# Patient Record
Sex: FEMALE | Race: WHITE | NOT HISPANIC OR LATINO | Employment: OTHER | URBAN - METROPOLITAN AREA
[De-identification: names, ages, dates, MRNs, and addresses within clinical notes are randomized per-mention and may not be internally consistent; named-entity substitution may affect disease eponyms.]

---

## 2021-05-25 ENCOUNTER — RECORDS - HEALTHEAST (OUTPATIENT)
Dept: ADMINISTRATIVE | Facility: CLINIC | Age: 76
End: 2021-05-25

## 2021-05-27 ENCOUNTER — RECORDS - HEALTHEAST (OUTPATIENT)
Dept: ADMINISTRATIVE | Facility: CLINIC | Age: 76
End: 2021-05-27

## 2021-05-28 ENCOUNTER — RECORDS - HEALTHEAST (OUTPATIENT)
Dept: ADMINISTRATIVE | Facility: CLINIC | Age: 76
End: 2021-05-28

## 2021-05-30 ENCOUNTER — RECORDS - HEALTHEAST (OUTPATIENT)
Dept: ADMINISTRATIVE | Facility: CLINIC | Age: 76
End: 2021-05-30

## 2021-06-01 ENCOUNTER — RECORDS - HEALTHEAST (OUTPATIENT)
Dept: ADMINISTRATIVE | Facility: CLINIC | Age: 76
End: 2021-06-01

## 2021-06-02 ENCOUNTER — RECORDS - HEALTHEAST (OUTPATIENT)
Dept: ADMINISTRATIVE | Facility: CLINIC | Age: 76
End: 2021-06-02

## 2022-01-06 ENCOUNTER — APPOINTMENT (OUTPATIENT)
Dept: RADIOLOGY | Facility: HOSPITAL | Age: 77
End: 2022-01-06
Attending: EMERGENCY MEDICINE
Payer: COMMERCIAL

## 2022-01-06 ENCOUNTER — HOSPITAL ENCOUNTER (EMERGENCY)
Facility: HOSPITAL | Age: 77
Discharge: HOME | End: 2022-01-06
Attending: FAMILY MEDICINE | Admitting: FAMILY MEDICINE
Payer: COMMERCIAL

## 2022-01-06 VITALS
WEIGHT: 150 LBS | HEIGHT: 67 IN | DIASTOLIC BLOOD PRESSURE: 74 MMHG | HEART RATE: 67 BPM | SYSTOLIC BLOOD PRESSURE: 132 MMHG | BODY MASS INDEX: 23.54 KG/M2 | TEMPERATURE: 98.4 F | OXYGEN SATURATION: 93 % | RESPIRATION RATE: 20 BRPM

## 2022-01-06 DIAGNOSIS — U07.1 COVID-19: ICD-10-CM

## 2022-01-06 LAB
ALBUMIN SERPL-MCNC: 3.6 G/DL (ref 3.5–5)
ALP SERPL-CCNC: 130 U/L (ref 45–120)
ALT SERPL W P-5'-P-CCNC: 109 U/L (ref 0–45)
ANION GAP SERPL CALCULATED.3IONS-SCNC: 13 MMOL/L (ref 5–18)
AST SERPL W P-5'-P-CCNC: 132 U/L (ref 0–40)
BASOPHILS # BLD AUTO: 0 10E3/UL (ref 0–0.2)
BASOPHILS NFR BLD AUTO: 0 %
BILIRUB SERPL-MCNC: 1.1 MG/DL (ref 0–1)
BNP SERPL-MCNC: 20 PG/ML (ref 0–137)
BUN SERPL-MCNC: 16 MG/DL (ref 8–28)
CALCIUM SERPL-MCNC: 9.3 MG/DL (ref 8.5–10.5)
CHLORIDE BLD-SCNC: 99 MMOL/L (ref 98–107)
CO2 SERPL-SCNC: 21 MMOL/L (ref 22–31)
CREAT SERPL-MCNC: 0.84 MG/DL (ref 0.6–1.1)
EOSINOPHIL # BLD AUTO: 0 10E3/UL (ref 0–0.7)
EOSINOPHIL NFR BLD AUTO: 0 %
ERYTHROCYTE [DISTWIDTH] IN BLOOD BY AUTOMATED COUNT: 13.2 % (ref 10–15)
FLUAV RNA SPEC QL NAA+PROBE: NEGATIVE
FLUBV RNA RESP QL NAA+PROBE: NEGATIVE
GFR SERPL CREATININE-BSD FRML MDRD: 72 ML/MIN/1.73M2
GLUCOSE BLD-MCNC: 130 MG/DL (ref 70–125)
HCT VFR BLD AUTO: 42.3 % (ref 35–47)
HGB BLD-MCNC: 14.2 G/DL (ref 11.7–15.7)
IMM GRANULOCYTES # BLD: 0.1 10E3/UL
IMM GRANULOCYTES NFR BLD: 1 %
LYMPHOCYTES # BLD AUTO: 1.1 10E3/UL (ref 0.8–5.3)
LYMPHOCYTES NFR BLD AUTO: 17 %
MAGNESIUM SERPL-MCNC: 1.9 MG/DL (ref 1.8–2.6)
MCH RBC QN AUTO: 27.3 PG (ref 26.5–33)
MCHC RBC AUTO-ENTMCNC: 33.6 G/DL (ref 31.5–36.5)
MCV RBC AUTO: 81 FL (ref 78–100)
MONOCYTES # BLD AUTO: 0.4 10E3/UL (ref 0–1.3)
MONOCYTES NFR BLD AUTO: 6 %
NEUTROPHILS # BLD AUTO: 4.8 10E3/UL (ref 1.6–8.3)
NEUTROPHILS NFR BLD AUTO: 76 %
NRBC # BLD AUTO: 0 10E3/UL
NRBC BLD AUTO-RTO: 0 /100
PLAT MORPH BLD: NORMAL
PLATELET # BLD AUTO: 259 10E3/UL (ref 150–450)
POTASSIUM BLD-SCNC: 3.4 MMOL/L (ref 3.5–5)
PROT SERPL-MCNC: 7.6 G/DL (ref 6–8)
RBC # BLD AUTO: 5.2 10E6/UL (ref 3.8–5.2)
RBC MORPH BLD: NORMAL
SARS-COV-2 RNA RESP QL NAA+PROBE: POSITIVE
SODIUM SERPL-SCNC: 133 MMOL/L (ref 136–145)
TROPONIN I SERPL-MCNC: 0.02 NG/ML (ref 0–0.29)
WBC # BLD AUTO: 6.3 10E3/UL (ref 4–11)

## 2022-01-06 PROCEDURE — 87636 SARSCOV2 & INF A&B AMP PRB: CPT | Performed by: EMERGENCY MEDICINE

## 2022-01-06 PROCEDURE — 71046 X-RAY EXAM CHEST 2 VIEWS: CPT

## 2022-01-06 PROCEDURE — 84484 ASSAY OF TROPONIN QUANT: CPT | Performed by: EMERGENCY MEDICINE

## 2022-01-06 PROCEDURE — 258N000003 HC RX IP 258 OP 636: Performed by: FAMILY MEDICINE

## 2022-01-06 PROCEDURE — 83735 ASSAY OF MAGNESIUM: CPT | Performed by: EMERGENCY MEDICINE

## 2022-01-06 PROCEDURE — 250N000011 HC RX IP 250 OP 636: Performed by: FAMILY MEDICINE

## 2022-01-06 PROCEDURE — 83880 ASSAY OF NATRIURETIC PEPTIDE: CPT | Performed by: EMERGENCY MEDICINE

## 2022-01-06 PROCEDURE — 85025 COMPLETE CBC W/AUTO DIFF WBC: CPT | Performed by: EMERGENCY MEDICINE

## 2022-01-06 PROCEDURE — 96374 THER/PROPH/DIAG INJ IV PUSH: CPT

## 2022-01-06 PROCEDURE — 250N000013 HC RX MED GY IP 250 OP 250 PS 637: Performed by: FAMILY MEDICINE

## 2022-01-06 PROCEDURE — 258N000003 HC RX IP 258 OP 636: Performed by: EMERGENCY MEDICINE

## 2022-01-06 PROCEDURE — 82040 ASSAY OF SERUM ALBUMIN: CPT | Performed by: EMERGENCY MEDICINE

## 2022-01-06 PROCEDURE — 99285 EMERGENCY DEPT VISIT HI MDM: CPT | Mod: 25

## 2022-01-06 PROCEDURE — C9803 HOPD COVID-19 SPEC COLLECT: HCPCS

## 2022-01-06 PROCEDURE — 80053 COMPREHEN METABOLIC PANEL: CPT | Performed by: EMERGENCY MEDICINE

## 2022-01-06 PROCEDURE — 96361 HYDRATE IV INFUSION ADD-ON: CPT

## 2022-01-06 PROCEDURE — 250N000013 HC RX MED GY IP 250 OP 250 PS 637: Performed by: EMERGENCY MEDICINE

## 2022-01-06 PROCEDURE — 36415 COLL VENOUS BLD VENIPUNCTURE: CPT | Performed by: EMERGENCY MEDICINE

## 2022-01-06 PROCEDURE — 93005 ELECTROCARDIOGRAM TRACING: CPT | Performed by: EMERGENCY MEDICINE

## 2022-01-06 RX ORDER — POTASSIUM CHLORIDE 1500 MG/1
20 TABLET, EXTENDED RELEASE ORAL DAILY
Qty: 7 TABLET | Refills: 0 | Status: ON HOLD | OUTPATIENT
Start: 2022-01-06 | End: 2022-01-17

## 2022-01-06 RX ORDER — POTASSIUM CHLORIDE 1.5 G/1.58G
20 POWDER, FOR SOLUTION ORAL ONCE
Status: COMPLETED | OUTPATIENT
Start: 2022-01-06 | End: 2022-01-06

## 2022-01-06 RX ORDER — ACETAMINOPHEN 325 MG/1
975 TABLET ORAL ONCE
Status: COMPLETED | OUTPATIENT
Start: 2022-01-06 | End: 2022-01-06

## 2022-01-06 RX ORDER — ONDANSETRON 2 MG/ML
4 INJECTION INTRAMUSCULAR; INTRAVENOUS ONCE
Status: COMPLETED | OUTPATIENT
Start: 2022-01-06 | End: 2022-01-06

## 2022-01-06 RX ORDER — ONDANSETRON 4 MG/1
4 TABLET, ORALLY DISINTEGRATING ORAL EVERY 6 HOURS PRN
Qty: 20 TABLET | Refills: 0 | Status: SHIPPED | OUTPATIENT
Start: 2022-01-06 | End: 2022-01-09

## 2022-01-06 RX ADMIN — ACETAMINOPHEN 975 MG: 325 TABLET ORAL at 17:19

## 2022-01-06 RX ADMIN — POTASSIUM CHLORIDE FOR ORAL SOLUTION 20 MEQ: 1.5 POWDER, FOR SOLUTION ORAL at 20:18

## 2022-01-06 RX ADMIN — ONDANSETRON 4 MG: 2 INJECTION INTRAMUSCULAR; INTRAVENOUS at 20:16

## 2022-01-06 RX ADMIN — SODIUM CHLORIDE 1000 ML: 9 INJECTION, SOLUTION INTRAVENOUS at 20:15

## 2022-01-06 RX ADMIN — SODIUM CHLORIDE 500 ML: 9 INJECTION, SOLUTION INTRAVENOUS at 17:04

## 2022-01-06 ASSESSMENT — MIFFLIN-ST. JEOR: SCORE: 1208.03

## 2022-01-06 NOTE — ED NOTES
Patient is very anxious in triage.  Respiratory rate 26, feels short of breath, oxygen sats 93-94% on RA.  Charge nurse, stefanie Beasley.

## 2022-01-06 NOTE — ED PROVIDER NOTES
"ED Provider In Triage Note  [facility]  Encounter Date: Jan 6, 2022    Chief Complaint   Patient presents with     Fatigue       Brief HPI:   Paulina Mendez is a 76 year old female presenting to the Emergency Department with a chief complaint of generalized fatigue/weakness since around Dec 24, believes she has covid, although no test completed. She also states she had covid twice before, but no testing. Pt unvaccinated for covid. She states she feels so weak, she needed to come in now for evaluation.  Reports mild cough, dyspnea, but no chest pain or leg pain/swelling.      Brief Physical Exam:  /74   Pulse 72   Temp 99.9  F (37.7  C) (Oral)   Resp 16   Ht 1.702 m (5' 7\")   Wt 68 kg (150 lb)   SpO2 94%   BMI 23.49 kg/m    General: Non-toxic appearing  HEENT: Atraumatic  Resp: No respiratory distress  Abdomen: Non-peritoneal  Neuro: Alert, oriented, answers questions appropriately  Psych: Behavior appropriate    Plan Initiated in Triage:  No orders of the defined types were placed in this encounter.  Labs, CXR.      PIT Dispo:   Return to lobby while awaiting workup and ED bed availability    Chad Ferreira MD on 1/6/2022 at 2:52 PM    Patient was evaluated by the Physician in Triage due to a limitation of available rooms in the Emergency Department. A plan of care was discussed based on the information obtained on the initial evaluation and patient was consuled to return back to the Emergency Department lobby after this initial evalutaiton until results were obtained or a room became available in the Emergency Department. Patient was counseled not to leave prior to receiving the results of their workup.        Chad Ferreira MD  01/06/22 1458    "

## 2022-01-06 NOTE — ED TRIAGE NOTES
Patient presents to ED for generalized weakness that has been ongoing for the past 2 weeks.  Has been around others who are sick.

## 2022-01-07 LAB
ATRIAL RATE - MUSE: 76 BPM
DIASTOLIC BLOOD PRESSURE - MUSE: NORMAL MMHG
INTERPRETATION ECG - MUSE: NORMAL
P AXIS - MUSE: 53 DEGREES
PR INTERVAL - MUSE: 164 MS
QRS DURATION - MUSE: 80 MS
QT - MUSE: 396 MS
QTC - MUSE: 445 MS
R AXIS - MUSE: 67 DEGREES
SYSTOLIC BLOOD PRESSURE - MUSE: NORMAL MMHG
T AXIS - MUSE: 47 DEGREES
VENTRICULAR RATE- MUSE: 76 BPM

## 2022-01-07 NOTE — ED PROVIDER NOTES
"EMERGENCY DEPARTMENT ENCOUNTER      NAME: Paulina Mendez  AGE: 76 year old female  YOB: 1945  MRN: [id number]  EVALUATION DATE & TIME: No admission date for patient encounter.    PCP: No primary care provider on file.    ED PROVIDER: Truman Gu M.D.    Chief Complaint   Patient presents with     Fatigue       FINAL IMPRESSION:  1. COVID-19        ED COURSE & MEDICAL DECISION MAKING:    Pertinent Labs & Imaging studies personally reviewed and interpreted by me. (See chart for details)  7:53 PM Patient seen and examined, prior records reviewed.  Patient seen in the [facility] due to high inpatient and emergency department volumes.  differential diagnosis includes but not limited to pneumonia, sepsis, urinary tract infection, intra-abdominal infection, medication reaction, electrolyte disturbance, anemia, dysrhythmia, myocardial infarction.  Patient presents with generalized weakness, found to be Covid positive in the emergency department.  No hypoxia, increased work of breathing, tachycardia, chest pain.  Labs are reassuring other than slightly elevated LFTs, no abdominal pain or right upper quadrant tenderness on exam.  No vomiting, white blood cell count is normal.  Mild hyponatremia, patient is received IV fluids.  Mild hypokalemia which will be replaced orally.  Patient asked to be admitted \"to have a place to lay down, someone to watch me overnight, and IV fluids.\"  We discussed that there is no indication for admission at this time, and that there are no beds available in the emergency department.  She will be given additional IV fluids, oral potassium replacement, and discharged.  She can continue to monitor oxygen levels at home.    At the conclusion of the encounter I discussed the results of all of the tests and the disposition. The questions were answered. The patient or family acknowledged understanding and was agreeable with the care plan.     PROCEDURES: " "  Procedures    MEDICATIONS GIVEN IN THE EMERGENCY:  Medications   0.9% sodium chloride BOLUS (has no administration in time range)   ondansetron (ZOFRAN) injection 4 mg (has no administration in time range)   potassium chloride (KLOR-CON) Packet 20 mEq (has no administration in time range)   0.9% sodium chloride BOLUS (0 mLs Intravenous Stopped 1/6/22 1748)   acetaminophen (TYLENOL) tablet 975 mg (975 mg Oral Given 1/6/22 1719)       NEW PRESCRIPTIONS STARTED AT TODAY'S ER VISIT  New Prescriptions    ONDANSETRON (ZOFRAN ODT) 4 MG ODT TAB    Take 1 tablet (4 mg) by mouth every 6 hours as needed for nausea    POTASSIUM CHLORIDE ER (KLOR-CON M) 20 MEQ CR TABLET    Take 1 tablet (20 mEq) by mouth daily for 7 days       =================================================================    HPI    Patient information was obtained from: Patient      Paulina Mendez is a 76 year old female with a pertinent history of anxiety, hyperlipidemia, and hypothyroidism who comes in today with weakness.  Complains of fatigue, body aches for the last couple of weeks, gradually getting worse.  In triage, she is a positive Covid test and when I spoke with her, she said \"I already know that.\"  She says she had Covid 3 times.  She is concerned about her weakness.  She says she is drinking well although does have some nausea.  She has not been taking anything else for her symptoms recently.  No chest pain, mild shortness of breath.    REVIEW OF SYSTEMS   Review of Systems   All other systems reviewed and negative    PAST MEDICAL HISTORY:  No past medical history on file.    PAST SURGICAL HISTORY:  Past Surgical History:   Procedure Laterality Date     C UNLISTED PROCEDURE, ABDOMEN/PERITONEUM/OMENTUM      Description: Hernia Repair;  Recorded: 07/28/2008;     HC REMOVAL OF TONSILS,<13 Y/O      Description: Tonsillectomy;  Recorded: 07/28/2008;       CURRENT MEDICATIONS:    Current Facility-Administered Medications   Medication     " "0.9% sodium chloride BOLUS     ondansetron (ZOFRAN) injection 4 mg     potassium chloride (KLOR-CON) Packet 20 mEq     Current Outpatient Medications   Medication     ondansetron (ZOFRAN ODT) 4 MG ODT tab     potassium chloride ER (KLOR-CON M) 20 MEQ CR tablet       ALLERGIES:  No Known Allergies    FAMILY HISTORY:  No family history on file.    SOCIAL HISTORY:   Social History     Socioeconomic History     Marital status:      Spouse name: Not on file     Number of children: Not on file     Years of education: Not on file     Highest education level: Not on file   Occupational History     Not on file   Tobacco Use     Smoking status: Not on file     Smokeless tobacco: Not on file   Substance and Sexual Activity     Alcohol use: Not on file     Drug use: Not on file     Sexual activity: Not on file   Other Topics Concern     Not on file   Social History Narrative     Not on file     Social Determinants of Health     Financial Resource Strain: Not on file   Food Insecurity: Not on file   Transportation Needs: Not on file   Physical Activity: Not on file   Stress: Not on file   Social Connections: Not on file   Intimate Partner Violence: Not on file   Housing Stability: Not on file       VITALS:  /63   Pulse 63   Temp 100  F (37.8  C) (Oral)   Resp 20   Ht 1.702 m (5' 7\")   Wt 68 kg (150 lb)   SpO2 94%   BMI 23.49 kg/m      PHYSICAL EXAM:  Physical Exam  Vitals and nursing note reviewed.   Constitutional:       Appearance: Normal appearance.   HENT:      Head: Normocephalic and atraumatic.      Right Ear: External ear normal.      Left Ear: External ear normal.      Nose: Nose normal.      Mouth/Throat:      Mouth: Mucous membranes are moist.   Eyes:      Extraocular Movements: Extraocular movements intact.      Conjunctiva/sclera: Conjunctivae normal.      Pupils: Pupils are equal, round, and reactive to light.   Cardiovascular:      Rate and Rhythm: Normal rate and regular rhythm.   Pulmonary: "      Effort: Pulmonary effort is normal.      Breath sounds: Normal breath sounds. No wheezing or rales.   Abdominal:      General: Abdomen is flat. There is no distension.      Palpations: Abdomen is soft.      Tenderness: There is no abdominal tenderness. There is no guarding.   Musculoskeletal:         General: Normal range of motion.      Cervical back: Normal range of motion and neck supple.      Right lower leg: No edema.      Left lower leg: No edema.   Lymphadenopathy:      Cervical: No cervical adenopathy.   Skin:     General: Skin is warm and dry.   Neurological:      General: No focal deficit present.      Mental Status: She is alert and oriented to person, place, and time. Mental status is at baseline.      Comments: No gross focal neurologic deficits   Psychiatric:         Mood and Affect: Mood normal.         Behavior: Behavior normal.         Thought Content: Thought content normal.          LAB:  All pertinent labs reviewed and interpreted.  Results for orders placed or performed during the hospital encounter of 01/06/22   XR Chest 2 Views    Impression    IMPRESSION: There are moderate, asymmetric bilateral airspace opacities with a lower lobe and peripheral predominance, left greater than right consistent with a Covid pneumonia. Heart and pulmonary vascularity are normal. Descending thoracic aorta is   ectatic.   Comprehensive metabolic panel   Result Value Ref Range    Sodium 133 (L) 136 - 145 mmol/L    Potassium 3.4 (L) 3.5 - 5.0 mmol/L    Chloride 99 98 - 107 mmol/L    Carbon Dioxide (CO2) 21 (L) 22 - 31 mmol/L    Anion Gap 13 5 - 18 mmol/L    Urea Nitrogen 16 8 - 28 mg/dL    Creatinine 0.84 0.60 - 1.10 mg/dL    Calcium 9.3 8.5 - 10.5 mg/dL    Glucose 130 (H) 70 - 125 mg/dL    Alkaline Phosphatase 130 (H) 45 - 120 U/L     (H) 0 - 40 U/L     (H) 0 - 45 U/L    Protein Total 7.6 6.0 - 8.0 g/dL    Albumin 3.6 3.5 - 5.0 g/dL    Bilirubin Total 1.1 (H) 0.0 - 1.0 mg/dL    GFR Estimate 72  >60 mL/min/1.73m2   Result Value Ref Range    Troponin I 0.02 0.00 - 0.29 ng/mL   Result Value Ref Range    Magnesium 1.9 1.8 - 2.6 mg/dL   B-Type Natriuretic Peptide ([facility] Only)   Result Value Ref Range    BNP 20 0 - 137 pg/mL   Symptomatic; Unknown Influenza A/B & SARS-CoV2 (COVID-19) Virus PCR Multiplex Nose    Specimen: Nose; Swab   Result Value Ref Range    Influenza A PCR Negative Negative    Influenza B PCR Negative Negative    SARS CoV2 PCR Positive (A) Negative   CBC with platelets and differential   Result Value Ref Range    WBC Count 6.3 4.0 - 11.0 10e3/uL    RBC Count 5.20 3.80 - 5.20 10e6/uL    Hemoglobin 14.2 11.7 - 15.7 g/dL    Hematocrit 42.3 35.0 - 47.0 %    MCV 81 78 - 100 fL    MCH 27.3 26.5 - 33.0 pg    MCHC 33.6 31.5 - 36.5 g/dL    RDW 13.2 10.0 - 15.0 %    Platelet Count 259 150 - 450 10e3/uL    % Neutrophils 76 %    % Lymphocytes 17 %    % Monocytes 6 %    % Eosinophils 0 %    % Basophils 0 %    % Immature Granulocytes 1 %    NRBCs per 100 WBC 0 <1 /100    Absolute Neutrophils 4.8 1.6 - 8.3 10e3/uL    Absolute Lymphocytes 1.1 0.8 - 5.3 10e3/uL    Absolute Monocytes 0.4 0.0 - 1.3 10e3/uL    Absolute Eosinophils 0.0 0.0 - 0.7 10e3/uL    Absolute Basophils 0.0 0.0 - 0.2 10e3/uL    Absolute Immature Granulocytes 0.1 <=0.4 10e3/uL    Absolute NRBCs 0.0 10e3/uL   RBC and Platelet Morphology   Result Value Ref Range    Platelet Assessment  Automated Count Confirmed. Platelet morphology is normal.     Automated Count Confirmed. Platelet morphology is normal.    RBC Morphology Confirmed RBC Indices        RADIOLOGY:  Reviewed all pertinent imaging. Please see official radiology report.  XR Chest 2 Views   Final Result   IMPRESSION: There are moderate, asymmetric bilateral airspace opacities with a lower lobe and peripheral predominance, left greater than right consistent with a Covid pneumonia. Heart and pulmonary vascularity are normal. Descending thoracic aorta is    ectatic.          EKG:     Performed at: 3:04 PM  Impression: Nonspecific ST changes, no acute ischemic changes  Rate: 76  Rhythm: Sinus  Axis: Normal  LA Interval: 164  QRS Interval: 80  QTc Interval: 45 differential diagnosis includes but not limited to pneumonia, sepsis, urinary tract infection, intra-abdominal infection, medication reaction, electrolyte disturbance, anemia, dysrhythmia, myocardial infarction.  ST Changes: No acute ischemic changes  Comparison: Prior December 2010, no acute changes    I have independently reviewed and interpreted the EKG(s) documented above.    I, Bib Flores, am serving as a scribe to document services personally performed by Dr. Gu based on my observation and the provider's statements to me. I, Truman Gu MD attest that Bib Flores is acting in a scribe capacity, has observed my performance of the services and has documented them in accordance with my direction.    Truman Gu M.D.  Emergency Medicine  [facility] EMERGENCY DEPARTMENT  [facility] [facility] [id number]  [phone]  Dept: [phone]       Truman Gu MD  01/06/22 2010

## 2022-01-07 NOTE — DISCHARGE INSTRUCTIONS
Your liver tests are slightly elevated, which is commonly seen with viral infections including COVID and will resolve when you are feeling better.

## 2022-01-11 ENCOUNTER — APPOINTMENT (OUTPATIENT)
Dept: CT IMAGING | Facility: HOSPITAL | Age: 77
DRG: 177 | End: 2022-01-11
Attending: EMERGENCY MEDICINE
Payer: COMMERCIAL

## 2022-01-11 ENCOUNTER — HOSPITAL ENCOUNTER (INPATIENT)
Facility: HOSPITAL | Age: 77
LOS: 6 days | Discharge: HOME HEALTH | DRG: 177 | End: 2022-01-17
Attending: EMERGENCY MEDICINE | Admitting: INTERNAL MEDICINE
Payer: COMMERCIAL

## 2022-01-11 DIAGNOSIS — R74.01 TRANSAMINITIS: ICD-10-CM

## 2022-01-11 DIAGNOSIS — U07.1 2019 NOVEL CORONAVIRUS DISEASE (COVID-19): ICD-10-CM

## 2022-01-11 DIAGNOSIS — N30.00 ACUTE CYSTITIS WITHOUT HEMATURIA: Primary | ICD-10-CM

## 2022-01-11 DIAGNOSIS — J96.01 ACUTE RESPIRATORY FAILURE WITH HYPOXIA (H): ICD-10-CM

## 2022-01-11 LAB
ALBUMIN SERPL-MCNC: 2.6 G/DL (ref 3.5–5)
ALBUMIN UR-MCNC: 200 MG/DL
ALP SERPL-CCNC: 264 U/L (ref 45–120)
ALT SERPL W P-5'-P-CCNC: 182 U/L (ref 0–45)
ANION GAP SERPL CALCULATED.3IONS-SCNC: 9 MMOL/L (ref 5–18)
APPEARANCE UR: ABNORMAL
AST SERPL W P-5'-P-CCNC: 107 U/L (ref 0–40)
ATRIAL RATE - MUSE: 90 BPM
BACTERIA #/AREA URNS HPF: ABNORMAL /HPF
BASE EXCESS BLDV CALC-SCNC: 3.6 MMOL/L
BASOPHILS # BLD AUTO: 0 10E3/UL (ref 0–0.2)
BASOPHILS NFR BLD AUTO: 0 %
BILIRUB DIRECT SERPL-MCNC: 0.5 MG/DL
BILIRUB SERPL-MCNC: 1.1 MG/DL (ref 0–1)
BILIRUB UR QL STRIP: NEGATIVE
BNP SERPL-MCNC: 61 PG/ML (ref 0–137)
BUN SERPL-MCNC: 12 MG/DL (ref 8–28)
C REACTIVE PROTEIN LHE: 13.8 MG/DL (ref 0–0.8)
CALCIUM SERPL-MCNC: 9.2 MG/DL (ref 8.5–10.5)
CHLORIDE BLD-SCNC: 101 MMOL/L (ref 98–107)
CO2 SERPL-SCNC: 25 MMOL/L (ref 22–31)
COLOR UR AUTO: YELLOW
CREAT SERPL-MCNC: 0.7 MG/DL (ref 0.6–1.1)
D DIMER PPP FEU-MCNC: 8.31 UG/ML FEU (ref 0–0.5)
DIASTOLIC BLOOD PRESSURE - MUSE: NORMAL MMHG
EOSINOPHIL # BLD AUTO: 0 10E3/UL (ref 0–0.7)
EOSINOPHIL NFR BLD AUTO: 0 %
ERYTHROCYTE [DISTWIDTH] IN BLOOD BY AUTOMATED COUNT: 13.8 % (ref 10–15)
GFR SERPL CREATININE-BSD FRML MDRD: 90 ML/MIN/1.73M2
GLUCOSE BLD-MCNC: 130 MG/DL (ref 70–125)
GLUCOSE UR STRIP-MCNC: NEGATIVE MG/DL
HCO3 BLDV-SCNC: 26 MMOL/L (ref 24–30)
HCT VFR BLD AUTO: 38.6 % (ref 35–47)
HGB BLD-MCNC: 12.9 G/DL (ref 11.7–15.7)
HGB UR QL STRIP: ABNORMAL
HOLD SPECIMEN: NORMAL
HOLD SPECIMEN: NORMAL
IMM GRANULOCYTES # BLD: 0.2 10E3/UL
IMM GRANULOCYTES NFR BLD: 2 %
INTERPRETATION ECG - MUSE: NORMAL
KETONES UR STRIP-MCNC: NEGATIVE MG/DL
LEUKOCYTE ESTERASE UR QL STRIP: ABNORMAL
LYMPHOCYTES # BLD AUTO: 0.9 10E3/UL (ref 0.8–5.3)
LYMPHOCYTES NFR BLD AUTO: 8 %
MCH RBC QN AUTO: 27.9 PG (ref 26.5–33)
MCHC RBC AUTO-ENTMCNC: 33.4 G/DL (ref 31.5–36.5)
MCV RBC AUTO: 83 FL (ref 78–100)
MONOCYTES # BLD AUTO: 0.3 10E3/UL (ref 0–1.3)
MONOCYTES NFR BLD AUTO: 2 %
NEUTROPHILS # BLD AUTO: 10.7 10E3/UL (ref 1.6–8.3)
NEUTROPHILS NFR BLD AUTO: 88 %
NITRATE UR QL: NEGATIVE
NRBC # BLD AUTO: 0 10E3/UL
NRBC BLD AUTO-RTO: 0 /100
OXYHGB MFR BLDV: 28.9 % (ref 70–75)
P AXIS - MUSE: -17 DEGREES
PCO2 BLDV: 40 MM HG (ref 35–50)
PH BLDV: 7.45 [PH] (ref 7.35–7.45)
PH UR STRIP: 6.5 [PH] (ref 5–7)
PLATELET # BLD AUTO: 455 10E3/UL (ref 150–450)
PO2 BLDV: 19 MM HG (ref 25–47)
POTASSIUM BLD-SCNC: 4 MMOL/L (ref 3.5–5)
PR INTERVAL - MUSE: 144 MS
PROCALCITONIN SERPL-MCNC: 0.34 NG/ML (ref 0–0.49)
PROT SERPL-MCNC: 7.1 G/DL (ref 6–8)
QRS DURATION - MUSE: 74 MS
QT - MUSE: 344 MS
QTC - MUSE: 420 MS
R AXIS - MUSE: 79 DEGREES
RBC # BLD AUTO: 4.63 10E6/UL (ref 3.8–5.2)
RBC URINE: 5 /HPF
SAO2 % BLDV: 29.4 % (ref 70–75)
SODIUM SERPL-SCNC: 135 MMOL/L (ref 136–145)
SP GR UR STRIP: >1.05 (ref 1–1.03)
SQUAMOUS EPITHELIAL: 1 /HPF
SYSTOLIC BLOOD PRESSURE - MUSE: NORMAL MMHG
T AXIS - MUSE: 36 DEGREES
TROPONIN I SERPL-MCNC: <0.01 NG/ML (ref 0–0.29)
TSH SERPL DL<=0.005 MIU/L-ACNC: 0.66 UIU/ML (ref 0.3–5)
UROBILINOGEN UR STRIP-MCNC: 3 MG/DL
VENTRICULAR RATE- MUSE: 90 BPM
WBC # BLD AUTO: 11.9 10E3/UL (ref 4–11)
WBC URINE: 70 /HPF

## 2022-01-11 PROCEDURE — 99223 1ST HOSP IP/OBS HIGH 75: CPT | Performed by: INTERNAL MEDICINE

## 2022-01-11 PROCEDURE — 120N000001 HC R&B MED SURG/OB

## 2022-01-11 PROCEDURE — 250N000011 HC RX IP 250 OP 636: Performed by: EMERGENCY MEDICINE

## 2022-01-11 PROCEDURE — 250N000013 HC RX MED GY IP 250 OP 250 PS 637: Performed by: INTERNAL MEDICINE

## 2022-01-11 PROCEDURE — 99285 EMERGENCY DEPT VISIT HI MDM: CPT | Mod: 25

## 2022-01-11 PROCEDURE — 87086 URINE CULTURE/COLONY COUNT: CPT | Performed by: EMERGENCY MEDICINE

## 2022-01-11 PROCEDURE — 84145 PROCALCITONIN (PCT): CPT | Performed by: EMERGENCY MEDICINE

## 2022-01-11 PROCEDURE — 250N000011 HC RX IP 250 OP 636: Performed by: INTERNAL MEDICINE

## 2022-01-11 PROCEDURE — 36415 COLL VENOUS BLD VENIPUNCTURE: CPT | Performed by: EMERGENCY MEDICINE

## 2022-01-11 PROCEDURE — 84484 ASSAY OF TROPONIN QUANT: CPT | Performed by: EMERGENCY MEDICINE

## 2022-01-11 PROCEDURE — 83036 HEMOGLOBIN GLYCOSYLATED A1C: CPT | Performed by: INTERNAL MEDICINE

## 2022-01-11 PROCEDURE — 82248 BILIRUBIN DIRECT: CPT | Performed by: EMERGENCY MEDICINE

## 2022-01-11 PROCEDURE — 86140 C-REACTIVE PROTEIN: CPT | Performed by: EMERGENCY MEDICINE

## 2022-01-11 PROCEDURE — 93005 ELECTROCARDIOGRAM TRACING: CPT | Performed by: EMERGENCY MEDICINE

## 2022-01-11 PROCEDURE — 96374 THER/PROPH/DIAG INJ IV PUSH: CPT | Mod: 59

## 2022-01-11 PROCEDURE — 81003 URINALYSIS AUTO W/O SCOPE: CPT | Performed by: EMERGENCY MEDICINE

## 2022-01-11 PROCEDURE — 85379 FIBRIN DEGRADATION QUANT: CPT | Performed by: INTERNAL MEDICINE

## 2022-01-11 PROCEDURE — 71275 CT ANGIOGRAPHY CHEST: CPT

## 2022-01-11 PROCEDURE — 83880 ASSAY OF NATRIURETIC PEPTIDE: CPT | Performed by: EMERGENCY MEDICINE

## 2022-01-11 PROCEDURE — 82805 BLOOD GASES W/O2 SATURATION: CPT | Performed by: EMERGENCY MEDICINE

## 2022-01-11 PROCEDURE — 80053 COMPREHEN METABOLIC PANEL: CPT | Performed by: EMERGENCY MEDICINE

## 2022-01-11 PROCEDURE — 85004 AUTOMATED DIFF WBC COUNT: CPT | Performed by: EMERGENCY MEDICINE

## 2022-01-11 PROCEDURE — 84443 ASSAY THYROID STIM HORMONE: CPT | Performed by: EMERGENCY MEDICINE

## 2022-01-11 RX ORDER — MULTIPLE VITAMINS W/ MINERALS TAB 9MG-400MCG
1 TAB ORAL DAILY
Status: DISCONTINUED | OUTPATIENT
Start: 2022-01-12 | End: 2022-01-17 | Stop reason: HOSPADM

## 2022-01-11 RX ORDER — VITAMIN B COMPLEX
25 TABLET ORAL DAILY
Status: DISCONTINUED | OUTPATIENT
Start: 2022-01-12 | End: 2022-01-17 | Stop reason: HOSPADM

## 2022-01-11 RX ORDER — ONDANSETRON 4 MG/1
4 TABLET, ORALLY DISINTEGRATING ORAL EVERY 8 HOURS PRN
Status: ON HOLD | COMMUNITY
End: 2022-01-17

## 2022-01-11 RX ORDER — CEPHALEXIN 500 MG/1
500 CAPSULE ORAL 2 TIMES DAILY
Status: ON HOLD | COMMUNITY
End: 2022-01-17

## 2022-01-11 RX ORDER — CEPHALEXIN 500 MG/1
500 CAPSULE ORAL 2 TIMES DAILY
Status: DISCONTINUED | OUTPATIENT
Start: 2022-01-11 | End: 2022-01-17 | Stop reason: HOSPADM

## 2022-01-11 RX ORDER — MULTIVIT WITH MINERALS/LUTEIN
500 TABLET ORAL DAILY
Status: DISCONTINUED | OUTPATIENT
Start: 2022-01-12 | End: 2022-01-17 | Stop reason: HOSPADM

## 2022-01-11 RX ORDER — DEXAMETHASONE 2 MG/1
6 TABLET ORAL DAILY
Status: DISCONTINUED | OUTPATIENT
Start: 2022-01-12 | End: 2022-01-17 | Stop reason: HOSPADM

## 2022-01-11 RX ORDER — ASPIRIN 81 MG/1
81 TABLET ORAL
Status: ON HOLD | COMMUNITY
End: 2022-01-17

## 2022-01-11 RX ORDER — ASCORBIC ACID 500 MG
500 TABLET ORAL DAILY
COMMUNITY
End: 2022-02-17

## 2022-01-11 RX ORDER — MULTIPLE VITAMINS W/ MINERALS TAB 9MG-400MCG
1 TAB ORAL DAILY
COMMUNITY

## 2022-01-11 RX ORDER — ACETAMINOPHEN 325 MG/1
325-650 TABLET ORAL EVERY 6 HOURS PRN
Status: DISCONTINUED | OUTPATIENT
Start: 2022-01-11 | End: 2022-01-17 | Stop reason: HOSPADM

## 2022-01-11 RX ORDER — ASPIRIN 81 MG/1
81 TABLET ORAL
Status: DISCONTINUED | OUTPATIENT
Start: 2022-01-12 | End: 2022-01-17 | Stop reason: HOSPADM

## 2022-01-11 RX ORDER — IOPAMIDOL 755 MG/ML
100 INJECTION, SOLUTION INTRAVASCULAR ONCE
Status: COMPLETED | OUTPATIENT
Start: 2022-01-11 | End: 2022-01-11

## 2022-01-11 RX ORDER — ACETAMINOPHEN 325 MG/1
325-650 TABLET ORAL EVERY 6 HOURS PRN
Status: ON HOLD | COMMUNITY
End: 2022-01-17

## 2022-01-11 RX ORDER — ZINC GLUCONATE 50 MG
50 TABLET ORAL DAILY
Status: DISCONTINUED | OUTPATIENT
Start: 2022-01-12 | End: 2022-01-17 | Stop reason: HOSPADM

## 2022-01-11 RX ORDER — ZINC GLUCONATE 50 MG
50 TABLET ORAL DAILY
COMMUNITY
End: 2022-02-17

## 2022-01-11 RX ORDER — ONDANSETRON 4 MG/1
4 TABLET, ORALLY DISINTEGRATING ORAL EVERY 8 HOURS PRN
Status: DISCONTINUED | OUTPATIENT
Start: 2022-01-11 | End: 2022-01-17 | Stop reason: HOSPADM

## 2022-01-11 RX ORDER — DEXAMETHASONE SODIUM PHOSPHATE 10 MG/ML
6 INJECTION, SOLUTION INTRAMUSCULAR; INTRAVENOUS ONCE
Status: COMPLETED | OUTPATIENT
Start: 2022-01-11 | End: 2022-01-11

## 2022-01-11 RX ADMIN — ENOXAPARIN SODIUM 30 MG: 30 INJECTION SUBCUTANEOUS at 18:56

## 2022-01-11 RX ADMIN — DEXAMETHASONE SODIUM PHOSPHATE 6 MG: 10 INJECTION, SOLUTION INTRAMUSCULAR; INTRAVENOUS at 12:17

## 2022-01-11 RX ADMIN — CEPHALEXIN 500 MG: 500 CAPSULE ORAL at 20:21

## 2022-01-11 RX ADMIN — IOPAMIDOL 100 ML: 755 INJECTION, SOLUTION INTRAVENOUS at 13:19

## 2022-01-11 ASSESSMENT — ENCOUNTER SYMPTOMS
FEVER: 0
NECK STIFFNESS: 0
ARTHRALGIAS: 0
DIFFICULTY URINATING: 0
ABDOMINAL PAIN: 0
HEADACHES: 0
CONFUSION: 0
EYE REDNESS: 0
NAUSEA: 0
SHORTNESS OF BREATH: 1
FATIGUE: 1
COLOR CHANGE: 0

## 2022-01-11 ASSESSMENT — ACTIVITIES OF DAILY LIVING (ADL)
ADLS_ACUITY_SCORE: 9

## 2022-01-11 ASSESSMENT — MIFFLIN-ST. JEOR: SCORE: 1192.15

## 2022-01-11 NOTE — ED PROVIDER NOTES
EMERGENCY DEPARTMENT ENCOUNTER      NAME: Paulina Mendez  AGE: 76 year old female  YOB: 1945  MRN: [id number]  EVALUATION DATE & TIME: 1/11/2022 11:58 AM    PCP: No Ref-Primary, Physician    ED PROVIDER: Jacob Burr M.D.      Chief Complaint   Patient presents with     Covid Concern     Fatigue         IMPRESSION  1. 2019 novel coronavirus disease (COVID-19)    2. Acute respiratory failure with hypoxia (H)    3. Transaminitis        PLAN  - admit to hospitalist for further care; med/surg admit    ED COURSE & MEDICAL DECISION MAKING    ED Course as of 01/11/22 1416   Tue Jan 11, 2022   1212 76yoF with history of hypothyroidism, HLD, no smoking or lung disease, prior COVID-19 disease x2, no prior COVID-19 vaccination who tested positive for COVID-19 (3rd time) on 1/6/22 (5 days ago); symptoms worsening since onset on 12/26/21. Lives alone at home. Notes worsening fatigue & shortness of breath so came to the ED; satting 80% on room air on presentation (normalized on 4L NC on exam). No distress on exam, clear lungs with normal work of breathing, no stridor, trace pitting edema to to shins bilaterally (chronic & baseline per patient) with no calf tenderness, benign abdomen, normal neuro exam. Suspect worsening COVID-19 driving presentation; will require admission given new hypoxia. IV Decadron thus ordered. Will obtain CT, EKG, blood with plan for admission. Patient denies any symptoms currently while on oxygen; no pain or nausea. Patient comfortable with this plan; no further questions at this time.   1343 CT chest with no PE; has classic COVID-19 pneumonia changes; otherwise no large effusion, edema, pneumothorax, other acute abnormality or explanatory pathology. Procal pending at this time; would use this to direct antibiotics for possible secondary bacterial pneumonia although otherwise more suggestive of pure viral pneumonia. EKG with no ischemic changes and troponin negative; doubt  primary or secondary ACS, myocarditis. Labs otherwise with WBC 11, CRP 13 (consistent with COVID-19), no LUIS ALBERTO, no glaring electrolyte abnormality. Mild transaminitis with , , alk phos 264, Tbili 1.1; no notable change from 5 days ago and suspect secondary to viral syndrome. VBG unremarkable with no retention. TSH within normal limits. Patient stable on 4L NC here now; stable for floor. No indication for NPPV at this time.       12:02 PM I met with the patient for the initial interview and physical examination. Discussed plan for treatment and workup in the ED.  1:47 PM I discussed the case with hospitalist, who agrees to admission.      This patient involved a high degree of complexity in medical decision making, as significant risks were present and assessed.    I wore the following PPE during this patient encounter:  N95 mask, face shield w/ eye protection, gloves, gown    MEDICATIONS GIVEN IN THE EMERGENCY DEPARTMENT  Medications   Medication instructions: Do NOT use nebulized medications (has no administration in time range)   dexamethasone (DECADRON) tablet 6 mg (has no administration in time range)   enoxaparin ANTICOAGULANT (LOVENOX) injection 40 mg (has no administration in time range)   dexamethasone PF (DECADRON) injection 6 mg (6 mg Intravenous Given 1/11/22 1217)   iopamidol (ISOVUE-370) solution 100 mL (100 mLs Intravenous Given 1/11/22 1319)               =================================================================      HPI  Patient information was obtained from: Patient and family    Use of : N/A       Paulina Mendez is a 76 year old female with a pertinent history of hypothyroidism, HLD, no smoking or lung disease, prior COVID-19 disease x2, no prior COVID-19 vaccination who tested positive for COVID-19 (3rd time) on 1/6/22 (5 days ago); symptoms worsening since onset on 12/26/21, who presents to this ED by private vehicle accompanied by family for evaluation of  fatigue and shortness of breath. Lives alone at home. Notes worsening fatigue & shortness of breath so came to the ED; satting 80% on room air on presentation (normalized on 4L NC on exam).     Per chart review, the patient was seen in this ED on 1/6/2022 for generalized weakness. Patient was found to be COVID-19 positive. Labs showed slightly elevated LFTs, mild hyponatremia, and mild hypokalemia, which was replaced orally. Chest X-Ray showed moderate bilateral airspace opacities consistent with COVID pneumonia. Patient was discharged with prescriptions for ondansetron and potassium chloride. Patient was then seen at [facility] earlier today (1/11/22) for concern for UTI. Patient was placed on pulse oximeter for visit and was satting between 88-91% at rest. Urinalysis showed some evidence of urinary tract infection. The patient was given prescription for Keflex. It was recommended for patient to go to the ER for worsening of shortness of breath and low oxygen saturation.    REVIEW OF SYSTEMS   Review of Systems   Constitutional: Positive for fatigue. Negative for fever.   HENT: Negative for congestion.    Eyes: Negative for redness.   Respiratory: Positive for shortness of breath.    Cardiovascular: Negative for chest pain.   Gastrointestinal: Negative for abdominal pain and nausea.   Genitourinary: Negative for difficulty urinating.   Musculoskeletal: Negative for arthralgias and neck stiffness.   Skin: Negative for color change.   Neurological: Negative for headaches.   Psychiatric/Behavioral: Negative for confusion.   All other systems reviewed and are negative.          --------------- MEDICAL HISTORY ---------------  PAST MEDICAL HISTORY:  History reviewed. No pertinent past medical history.  Patient Active Problem List   Diagnosis     Hyperlipidemia     Osteoporosis     Hypothyroidism     Anxiety     Transaminitis     Acute respiratory failure with hypoxia (H)     2019 novel  coronavirus disease (COVID-19)       PAST SURGICAL HISTORY:  Past Surgical History:   Procedure Laterality Date     C UNLISTED PROCEDURE, ABDOMEN/PERITONEUM/OMENTUM      Description: Hernia Repair;  Recorded: 07/28/2008;     HC REMOVAL OF TONSILS,<13 Y/O      Description: Tonsillectomy;  Recorded: 07/28/2008;     ORTHOPEDIC SURGERY         CURRENT MEDICATIONS:      Current Facility-Administered Medications:      [START ON 1/12/2022] dexamethasone (DECADRON) tablet 6 mg, 6 mg, Oral, Daily, Shlomo Alicea MD     enoxaparin ANTICOAGULANT (LOVENOX) injection 40 mg, 40 mg, Subcutaneous, BID, Shlomo Alicea MD     Medication instructions: Do NOT use nebulized medications, , Does not apply, Continuous PRN, Shlomo Alicea MD    Current Outpatient Medications:      acetaminophen (TYLENOL) 325 MG tablet, Take 325-650 mg by mouth every 6 hours as needed for mild pain or fever, Disp: , Rfl:      aspirin 81 MG EC tablet, Take 81 mg by mouth three times a week Mon, Wed, Fri, Disp: , Rfl:      cephALEXin (KEFLEX) 500 MG capsule, Take 500 mg by mouth 2 times daily Started 1/11/22 for 7 days, has not taken a dose yet, Disp: , Rfl:      cholecalciferol 25 MCG (1000 UT) TABS, Take 1 tablet by mouth daily, Disp: , Rfl:      multivitamin w/minerals (MULTI-VITAMIN) tablet, Take 1 tablet by mouth daily, Disp: , Rfl:      ondansetron (ZOFRAN-ODT) 4 MG ODT tab, Take 4 mg by mouth every 8 hours as needed for nausea, Disp: , Rfl:      potassium chloride ER (KLOR-CON M) 20 MEQ CR tablet, Take 1 tablet (20 mEq) by mouth daily for 7 days, Disp: 7 tablet, Rfl: 0     vitamin C (ASCORBIC ACID) 500 MG tablet, Take 500 mg by mouth daily, Disp: , Rfl:      zinc gluconate 50 MG tablet, Take 50 mg by mouth daily, Disp: , Rfl:     ALLERGIES:  No Known Allergies    FAMILY HISTORY:  History reviewed. No pertinent family history.    SOCIAL HISTORY:   Social History     Socioeconomic History     Marital status:      Spouse name: None      Number of children: None     Years of education: None     Highest education level: None   Occupational History     None   Tobacco Use     Smoking status: Never Smoker     Smokeless tobacco: Never Used   Substance and Sexual Activity     Alcohol use: Not Currently     Drug use: Not Currently     Sexual activity: None   Other Topics Concern     None   Social History Narrative     None     Social Determinants of Health     Financial Resource Strain: Not on file   Food Insecurity: Not on file   Transportation Needs: Not on file   Physical Activity: Not on file   Stress: Not on file   Social Connections: Not on file   Intimate Partner Violence: Not on file   Housing Stability: Not on file         --------------- PHYSICAL EXAM ---------------  Nursing notes and vitals reviewed by me.  VITALS:  Vitals:    01/11/22 1230 01/11/22 1245 01/11/22 1300 01/11/22 1330   BP: (!) 141/66 133/65 136/65 (!) 155/69   Pulse: 82 84 82    Resp: 24 30 27    Temp:  99.1  F (37.3  C)     TempSrc:  Oral     SpO2: 95% 94% 94%    Weight:       Height:           PHYSICAL EXAM:    General:  alert, interactive, no distress  Eyes:  conjunctivae clear, conjugate gaze   HENT:  atraumatic, nose with no rhinorrhea, oropharynx clear  Neck:  no meningismus  Cardiovascular:  HR 80s during exam, regular rhythm, no murmurs, brisk cap refill  Chest:  no chest wall tenderness  Pulmonary:  no stridor, normal phonation, normal work of breathing, clear lungs bilaterally, satting 94% on 4 liters nasal cannula  Abdomen:  soft, nondistended, nontender  :  no CVA tenderness  Back:  no midline spinal tenderness  Musculoskeletal: no calf tenderness. Gross ROM intact to joints of extremities with no obvious deformities. Trace edema to mid shins bilaterally (patient states that it's chronic and baseline)  Skin:  warm, dry, no rash  Neuro:  awake, alert, answers questions appropriately, follows commands, moves all limbs  Psych:  calm, normal affect      ---------------  RESULTS ---------------  EKG:    Reviewed and interpreted by me.  NSR at 90bpm, no ST or T wave changes, normal intervals  Unchanged from prior on 1/6/22  My read.    LAB:  Reviewed and interpreted by me.  Results for orders placed or performed during the hospital encounter of 01/11/22   CT Chest Pulmonary Embolism w Contrast    Impression    IMPRESSION:    1.  No acute pulmonary embolism or aortopathy.  2.  Commonly reported imaging features of (COVID-19) pneumonia are present.   3.  Minimal hiatus hernia.   Basic metabolic panel   Result Value Ref Range    Sodium 135 (L) 136 - 145 mmol/L    Potassium 4.0 3.5 - 5.0 mmol/L    Chloride 101 98 - 107 mmol/L    Carbon Dioxide (CO2) 25 22 - 31 mmol/L    Anion Gap 9 5 - 18 mmol/L    Urea Nitrogen 12 8 - 28 mg/dL    Creatinine 0.70 0.60 - 1.10 mg/dL    Calcium 9.2 8.5 - 10.5 mg/dL    Glucose 130 (H) 70 - 125 mg/dL    GFR Estimate 90 >60 mL/min/1.73m2   Result Value Ref Range    Troponin I <0.01 0.00 - 0.29 ng/mL   B-Type Natriuretic Peptide ([facility] Only)   Result Value Ref Range    BNP 61 0 - 137 pg/mL   CBC with platelets and differential   Result Value Ref Range    WBC Count 11.9 (H) 4.0 - 11.0 10e3/uL    RBC Count 4.63 3.80 - 5.20 10e6/uL    Hemoglobin 12.9 11.7 - 15.7 g/dL    Hematocrit 38.6 35.0 - 47.0 %    MCV 83 78 - 100 fL    MCH 27.9 26.5 - 33.0 pg    MCHC 33.4 31.5 - 36.5 g/dL    RDW 13.8 10.0 - 15.0 %    Platelet Count 455 (H) 150 - 450 10e3/uL    % Neutrophils 88 %    % Lymphocytes 8 %    % Monocytes 2 %    % Eosinophils 0 %    % Basophils 0 %    % Immature Granulocytes 2 %    NRBCs per 100 WBC 0 <1 /100    Absolute Neutrophils 10.7 (H) 1.6 - 8.3 10e3/uL    Absolute Lymphocytes 0.9 0.8 - 5.3 10e3/uL    Absolute Monocytes 0.3 0.0 - 1.3 10e3/uL    Absolute Eosinophils 0.0 0.0 - 0.7 10e3/uL    Absolute Basophils 0.0 0.0 - 0.2 10e3/uL    Absolute Immature Granulocytes 0.2 <=0.4 10e3/uL    Absolute NRBCs 0.0 10e3/uL   Hepatic function panel   Result Value Ref Range     Bilirubin Total 1.1 (H) 0.0 - 1.0 mg/dL    Bilirubin Direct 0.5 <=0.5 mg/dL    Protein Total 7.1 6.0 - 8.0 g/dL    Albumin 2.6 (L) 3.5 - 5.0 g/dL    Alkaline Phosphatase 264 (H) 45 - 120 U/L     (H) 0 - 40 U/L     (H) 0 - 45 U/L   Blood gas venous   Result Value Ref Range    pH Venous 7.45 7.35 - 7.45    pCO2 Venous 40 35 - 50 mm Hg    pO2 Venous 19 (L) 25 - 47 mm Hg    Bicarbonate Venous 26 24 - 30 mmol/L    Base Excess/Deficit (+/-) 3.6   mmol/L    Oxyhemoglobin Venous 28.9 (L) 70.0 - 75.0 %    O2 Sat, Venous 29.4 (L) 70.0 - 75.0 %   CRP inflammation   Result Value Ref Range    CRP 13.8 (H) 0.0-<0.8 mg/dL   Result Value Ref Range    Procalcitonin 0.34 0.00 - 0.49 ng/mL   Extra Blue Top Tube   Result Value Ref Range    Hold Specimen JIC    Extra Green Top (Lithium Heparin) ON ICE   Result Value Ref Range    Hold Specimen JIC    UA with Microscopic reflex to Culture    Specimen: Urine, Midstream   Result Value Ref Range    Color Urine Yellow Colorless, Straw, Light Yellow, Yellow    Appearance Urine Turbid (A) Clear    Glucose Urine Negative Negative mg/dL    Bilirubin Urine Negative Negative    Ketones Urine Negative Negative mg/dL    Specific Gravity Urine >1.050 (H) 1.001 - 1.030    Blood Urine 0.1 mg/dL (A) Negative    pH Urine 6.5 5.0 - 7.0    Protein Albumin Urine 200  (A) Negative mg/dL    Urobilinogen Urine 3.0 (A) <2.0 mg/dL    Nitrite Urine Negative Negative    Leukocyte Esterase Urine 250 Cherri/uL (A) Negative    Bacteria Urine Many (A) None Seen /HPF    RBC Urine 5 (H) <=2 /HPF    WBC Urine 70 (H) <=5 /HPF    Squamous Epithelials Urine 1 <=1 /HPF   TSH with free T4 reflex   Result Value Ref Range    TSH 0.66 0.30 - 5.00 uIU/mL   D dimer quantitative   Result Value Ref Range    D-Dimer Quantitative 8.31 (H) 0.00 - 0.50 ug/mL FEU   ECG 12-LEAD WITH MUSE ([facility])   Result Value Ref Range    Systolic Blood Pressure  mmHg    Diastolic Blood Pressure  mmHg    Ventricular Rate 90 BPM    Atrial Rate  90 BPM    AL Interval 144 ms    QRS Duration 74 ms     ms    QTc 420 ms    P Axis -17 degrees    R AXIS 79 degrees    T Axis 36 degrees    Interpretation ECG        Poor data quality, interpretation may be adversely affected  Sinus rhythm  Nonspecific ST abnormality  Abnormal ECG  When compared with ECG of 06-JAN-2022 15:04,  Nonspecific T wave abnormality no longer evident in Anterolateral leads  Confirmed by SEE ED PROVIDER NOTE FOR, ECG INTERPRETATION ([id number]),  JEANNE GRULLON ([id number]) on 1/11/2022 12:44:30 PM         RADIOLOGY:  Reviewed by me. Please see official radiology report.  Recent Results (from the past 24 hour(s))   CT Chest Pulmonary Embolism w Contrast    Narrative    EXAM: CT CHEST PULMONARY EMBOLISM W CONTRAST  LOCATION: [facility]  DATE/TIME: 1/11/2022 1:14 PM    INDICATION: COVID pneumonia, hypoxia  COMPARISON: None.  TECHNIQUE: CT chest pulmonary angiogram during arterial phase injection of IV contrast. Multiplanar reformats and MIP reconstructions were performed. Dose reduction techniques were used.   CONTRAST: Isovue 370 100ml    FINDINGS:  ANGIOGRAM CHEST: Pulmonary arteries are normal caliber and negative for pulmonary emboli. No thoracic aortic aneurysm or findings to suggest acute aortic syndrome. A few small foci of atheromatous calcified plaque are present in the descending thoracic   aorta.    LUNGS AND PLEURA: Extensive bilateral mixed alveolar and interstitial opacities are present. The alveolar component is a mixture of groundglass and consolidative opacity. Within the territories of groundglass there is intralobular septal thickening   (crazy paving). Trachea and central airways are patent. No pleural effusion.    MEDIASTINUM: cardiac chambers are normal in size. No pericardial effusion. Conventional arch anatomy. Tortuosity of the imaged proximal great vessels. Small hiatal hernia. Esophagus is decompressed. Upper limit of normal-sized hilar,  subcarinal, and   right lower paratracheal lymph nodes, likely reactive in the setting of airspace disease.    CORONARY ARTERY CALCIFICATION: None.    UPPER ABDOMEN: Normal.    MUSCULOSKELETAL: Multilevel mild to moderate degenerative disc space narrowing and small marginal osteophytes of thoracic vertebra. No aggressive or destructive bone lesions are present. Mature bony callus around old fracture deformity of the right   lateral sixth rib.      Impression    IMPRESSION:    1.  No acute pulmonary embolism or aortopathy.  2.  Commonly reported imaging features of (COVID-19) pneumonia are present.   3.  Minimal hiatus hernia.       PROCEDURES:   Procedures   Critical Care     Performed by:   Jacob Burr MD   Authorized by:   Jacob Burr MD  Total critical care time: 35 minutes (Critical care time was exclusive of separately billable procedures and treating other patients.)    Critical care was necessary to treat or prevent imminent or life-threatening deterioration of the following conditions: COVID disease with hypoxia    Critical care was time spent personally by me on the following activities:  - obtaining history from patient or surrogate  - examination of patient  - development of treatment plan with patient or surrogate  - ordering and performing treatments and interventions  - ordering and review of laboratory studies  - ordering and review of radiographic studies  - re-evaluation of patient's condition  - monitoring for potential decompensation  - discussions with consultants  ---------------------------------------------------------------------------------------------------------------------  ---------------------------------------------------------------------------------------------------------------------        I, Pa Naa Isidro, am serving as a scribe to document services personally performed by Dr. Jacob Burr based on my observation and the provider's statements to me. I, Jacob Burr,  MD attest that Jasson Isidro is acting in a scribe capacity, has observed my performance of the services and has documented them in accordance with my direction.      Jacob Burr MD  01/11/22  Emergency Medicine  [facility] EMERGENCY DEPARTMENT  [address] [place] [id number]  [phone]  Dept: [phone]     Jacob Burr MD  01/11/22 4823

## 2022-01-11 NOTE — ED NOTES
Pt up to commode and oxygen did not reach so pt went without oxygen for about 5 minutes and when hooked back up pts saturations was 80% on room air.  Oxygen reapplied and at 4L/NC and saturations returned to 98%.  Pt was very SOB with little movement

## 2022-01-11 NOTE — PHARMACY-MEDICATION REGIMEN REVIEW
Pharmacy Note - Admission Medication History    Pertinent Provider Information: Keflex started today but pt never took yet, started vitamins recently for COVID     ______________________________________________________________________    Prior To Admission (PTA) med list completed and updated in EMR.       PTA Med List   Medication Sig Note Last Dose     acetaminophen (TYLENOL) 325 MG tablet Take 325-650 mg by mouth every 6 hours as needed for mild pain or fever  Past Week at Unknown time     aspirin 81 MG EC tablet Take 81 mg by mouth three times a week Mon, Wed, Fri  1/10/2022 at Unknown time     cephALEXin (KEFLEX) 500 MG capsule Take 500 mg by mouth 2 times daily Started 1/11/22 for 7 days, has not taken a dose yet  did not start yet     cholecalciferol 25 MCG (1000 UT) TABS Take 1 tablet by mouth daily  1/11/2022 at Unknown time     multivitamin w/minerals (MULTI-VITAMIN) tablet Take 1 tablet by mouth daily  1/11/2022 at Unknown time     ondansetron (ZOFRAN-ODT) 4 MG ODT tab Take 4 mg by mouth every 8 hours as needed for nausea  Past Week at Unknown time     potassium chloride ER (KLOR-CON M) 20 MEQ CR tablet Take 1 tablet (20 mEq) by mouth daily for 7 days 1/11/2022: 1/7/22 for 7 days 1/11/2022 at Unknown time     vitamin C (ASCORBIC ACID) 500 MG tablet Take 500 mg by mouth daily  1/11/2022 at Unknown time     zinc gluconate 50 MG tablet Take 50 mg by mouth daily  1/11/2022 at Unknown time       Information source(s): [facility]Everywhere[facility]  Method of interview communication: phone    Summary of Changes to PTA Med List  New: entire list    Patient was asked about OTC/herbal products specifically.  PTA med list reflects this.    In the past week, patient estimated taking medication this percent of the time:  greater than 90%.    Allergies were reviewed, assessed, and updated with the patient.      Patient does not use any multi-dose medications prior to admission.    The information provided in  this note is only as accurate as the sources available at the time of the update(s).    Thank you for the opportunity to participate in the care of this patient.    Deana Dowd [facility]  1/11/2022 12:44 PM

## 2022-01-11 NOTE — ED NOTES
Pt consented to updating her sister-in-law about her Updated pt's sister-in-law , Nicki  ( #[phone]) about pt

## 2022-01-11 NOTE — ED TRIAGE NOTES
Pt recently diagnosed with Covid on 1/6 with having symptoms since 12/27. Pt was just seen in Urgent Care for a poss UTI. Pt complains of fatigue and weakness. Pt was 80% on room air. Placed on O2 at 5L before coming up to 94%. Pt visibly short of breath with conversation.

## 2022-01-11 NOTE — ED NOTES
Pt was asking for a  to help her with medical decisions, relayed this to incoming ALEIDA Gulilermo and if able to provide advance directive papers for pt to read and decide on

## 2022-01-11 NOTE — H&P
Admission History and Physical   Paulina Mendez,    1945,   [id number]    [facility]   Transaminitis [R74.01]    PCP: No Ref-Primary, Physician,    Code status:  No Order       Extended Emergency Contact Information  Primary Emergency Contact: TODD REED  Home Phone: [phone]  Relation: Brother  Secondary Emergency Contact: Adelia Nelson  Mobile Phone: [phone]  Relation: Daughter       Active Problems:    Transaminitis    Acute respiratory failure with hypoxia (H)    2019 novel coronavirus disease (COVID-19)      ASSESSMENT AND PLAN:  covid pna , not vaccinated, prior COVID disease x2, symptoms started on 21. She subsequently tested positive on 22.  CT showing extensive infiltrates.  Start steroids due to hypoxia, no remdesivir due to elevated LFTs.  Isolate per protocol, COVID labs daily    Acute respiratory failure due to above.  On O2 at 2 L/min.  Begin steroids as above.  Escalate therapy for COVID if worsening hypoxia    Elevated LFTs likely due to COVID, will trend LFTs.  Hold remdesivir for now    Hypothyroid, resume home meds same dose    Recent UTI diagnosis, continue antibiotics for 7 days    Full code per her wishes    DVT PPX:  Lovenox twice a day    Barriers to discharge hypoxia    Anticipated Discharge date inpatient more than 2 midnights          Chief Complaint:  Breathing difficulty few days duration    HPI:  Paulina Mendez is a 76 year old old female diagnosed COVID twice in the past not vaccinated presenting with symptoms of breathing difficulty a few days duration.  Also fatigue but no chest pain.  Denies abdominal pain, no diarrhea, no fever no headaches or urinary symptoms currently.  Work-up showed COVID-pneumonia with hypoxia.  She is admitted for the work-up      Medical History  Hypothyroid, hyperlipidemia, anxiety,      Surgical History  She  has a past surgical history that includes REMOVAL OF TONSILS,<13 Y/O;  "COMPARTMENT STUDY ABDOMINAL; and orthopedic surgery.      SOCIAL HISTORY:  Social History     Socioeconomic History     Marital status:      Spouse name: Not on file     Number of children: Not on file     Years of education: Not on file     Highest education level: Not on file   Occupational History     Not on file   Tobacco Use     Smoking status: Never Smoker     Smokeless tobacco: Never Used   Substance and Sexual Activity     Alcohol use: Not Currently     Drug use: Not Currently     Sexual activity: Not on file   Other Topics Concern     Not on file   Social History Narrative     Not on file     Social Determinants of Health     Financial Resource Strain: Not on file   Food Insecurity: Not on file   Transportation Needs: Not on file   Physical Activity: Not on file   Stress: Not on file   Social Connections: Not on file   Intimate Partner Violence: Not on file   Housing Stability: Not on file       FAMILY HISTORY:  History reviewed. No pertinent family history.      ALLERGIES:  No Known Allergies    MEDICATIONS:        ROS:  12 point review of systems reviewed and is negative except as stated above      PHYSICAL EXAM:  BP (!) 155/69   Pulse 82   Temp 99.1  F (37.3  C) (Oral)   Resp 27   Ht 1.676 m (5' 6\")   Wt 68 kg (150 lb)   SpO2 94%   BMI 24.21 kg/m      General: Alert and oriented x 3. Not in obvious distress.  HEENT: Pupils equal and reactive,ENT WNL   Neck- supple, No JVP elevation, lymphadenopathy or thyromegaly. Trachea-central.  Chest: Crackles bases  Heart: S1S2 regular. No M/R/G.  Abdomen: Soft. NT, ND. No organomegaly. Bowel sounds- active.  Back: No spine tenderness. No CVA tenderness.  Extremities: No leg swelling. Peripheral pulses 2+ bilaterally.  Neuro: No focal neurological deficit  Skin: no skin rashes     DIAGNOSTIC DATA:        EKG Results: Personally reviewed        Advanced Care Planning       Shlomo Alicea MD       "

## 2022-01-11 NOTE — ED PROVIDER NOTES
PIT Note     Patiently initially exposed to COVID-19 on [place]. Symptoms started on 12/27/21. She subsequently tested positive on 1/6/22. Patient has been short of breath, especially with activity. Over the last couple days, she has developed a cough with yellow-brown sputum. She denies any associated chest pain. She has felt incredibly fatigued and weak. She was seen at Urgent Care and diagnosed with a UTI and prescribed cephalexin which she has yet to start.     Patient is accompanied by her sister-in-law    PE:   General: alert and oriented  Pulm: CTA, no respiratory distress     Brianna Cano MD  01/11/22 2345

## 2022-01-12 PROBLEM — J12.82 PNEUMONIA DUE TO 2019 NOVEL CORONAVIRUS: Status: ACTIVE | Noted: 2022-01-11

## 2022-01-12 LAB
ALBUMIN SERPL-MCNC: 2.5 G/DL (ref 3.5–5)
ALP SERPL-CCNC: 230 U/L (ref 45–120)
ALT SERPL W P-5'-P-CCNC: 144 U/L (ref 0–45)
ANION GAP SERPL CALCULATED.3IONS-SCNC: 13 MMOL/L (ref 5–18)
AST SERPL W P-5'-P-CCNC: 67 U/L (ref 0–40)
BACTERIA UR CULT: ABNORMAL
BILIRUB DIRECT SERPL-MCNC: 0.4 MG/DL
BILIRUB SERPL-MCNC: 0.8 MG/DL (ref 0–1)
BUN SERPL-MCNC: 18 MG/DL (ref 8–28)
C REACTIVE PROTEIN LHE: 14.3 MG/DL (ref 0–0.8)
CALCIUM SERPL-MCNC: 9.6 MG/DL (ref 8.5–10.5)
CHLORIDE BLD-SCNC: 102 MMOL/L (ref 98–107)
CO2 SERPL-SCNC: 21 MMOL/L (ref 22–31)
CREAT SERPL-MCNC: 0.64 MG/DL (ref 0.6–1.1)
D DIMER PPP FEU-MCNC: 7.04 UG/ML FEU (ref 0–0.5)
ERYTHROCYTE [DISTWIDTH] IN BLOOD BY AUTOMATED COUNT: 13.7 % (ref 10–15)
FIBRINOGEN PPP-MCNC: 1089 MG/DL (ref 170–490)
GFR SERPL CREATININE-BSD FRML MDRD: >90 ML/MIN/1.73M2
GLUCOSE BLD-MCNC: 146 MG/DL (ref 70–125)
GLUCOSE BLDC GLUCOMTR-MCNC: 148 MG/DL (ref 70–99)
GLUCOSE BLDC GLUCOMTR-MCNC: 201 MG/DL (ref 70–99)
GLUCOSE BLDC GLUCOMTR-MCNC: 228 MG/DL (ref 70–99)
GLUCOSE BLDC GLUCOMTR-MCNC: 255 MG/DL (ref 70–99)
HBA1C MFR BLD: 6.2 %
HCT VFR BLD AUTO: 38.1 % (ref 35–47)
HGB BLD-MCNC: 12.5 G/DL (ref 11.7–15.7)
INR PPP: 1.16 (ref 0.85–1.15)
LDH SERPL L TO P-CCNC: 571 U/L (ref 125–220)
MCH RBC QN AUTO: 27.7 PG (ref 26.5–33)
MCHC RBC AUTO-ENTMCNC: 32.8 G/DL (ref 31.5–36.5)
MCV RBC AUTO: 84 FL (ref 78–100)
PLATELET # BLD AUTO: 526 10E3/UL (ref 150–450)
POTASSIUM BLD-SCNC: 4.3 MMOL/L (ref 3.5–5)
PROT SERPL-MCNC: 7.2 G/DL (ref 6–8)
RBC # BLD AUTO: 4.52 10E6/UL (ref 3.8–5.2)
SODIUM SERPL-SCNC: 136 MMOL/L (ref 136–145)
WBC # BLD AUTO: 10.1 10E3/UL (ref 4–11)

## 2022-01-12 PROCEDURE — 86140 C-REACTIVE PROTEIN: CPT | Performed by: INTERNAL MEDICINE

## 2022-01-12 PROCEDURE — 250N000011 HC RX IP 250 OP 636: Performed by: INTERNAL MEDICINE

## 2022-01-12 PROCEDURE — 85379 FIBRIN DEGRADATION QUANT: CPT | Performed by: INTERNAL MEDICINE

## 2022-01-12 PROCEDURE — 82248 BILIRUBIN DIRECT: CPT | Performed by: INTERNAL MEDICINE

## 2022-01-12 PROCEDURE — 250N000013 HC RX MED GY IP 250 OP 250 PS 637: Performed by: INTERNAL MEDICINE

## 2022-01-12 PROCEDURE — 99233 SBSQ HOSP IP/OBS HIGH 50: CPT | Performed by: INTERNAL MEDICINE

## 2022-01-12 PROCEDURE — 250N000012 HC RX MED GY IP 250 OP 636 PS 637: Performed by: INTERNAL MEDICINE

## 2022-01-12 PROCEDURE — 85610 PROTHROMBIN TIME: CPT | Performed by: INTERNAL MEDICINE

## 2022-01-12 PROCEDURE — 85384 FIBRINOGEN ACTIVITY: CPT | Performed by: INTERNAL MEDICINE

## 2022-01-12 PROCEDURE — 80053 COMPREHEN METABOLIC PANEL: CPT | Performed by: INTERNAL MEDICINE

## 2022-01-12 PROCEDURE — 36415 COLL VENOUS BLD VENIPUNCTURE: CPT | Performed by: INTERNAL MEDICINE

## 2022-01-12 PROCEDURE — 83615 LACTATE (LD) (LDH) ENZYME: CPT | Performed by: INTERNAL MEDICINE

## 2022-01-12 PROCEDURE — 85027 COMPLETE CBC AUTOMATED: CPT | Performed by: INTERNAL MEDICINE

## 2022-01-12 PROCEDURE — 120N000001 HC R&B MED SURG/OB

## 2022-01-12 RX ORDER — NICOTINE POLACRILEX 4 MG
15-30 LOZENGE BUCCAL
Status: DISCONTINUED | OUTPATIENT
Start: 2022-01-12 | End: 2022-01-17 | Stop reason: HOSPADM

## 2022-01-12 RX ORDER — BENZONATATE 100 MG/1
100 CAPSULE ORAL 3 TIMES DAILY PRN
Status: DISCONTINUED | OUTPATIENT
Start: 2022-01-12 | End: 2022-01-17 | Stop reason: HOSPADM

## 2022-01-12 RX ORDER — DEXTROSE MONOHYDRATE 25 G/50ML
25-50 INJECTION, SOLUTION INTRAVENOUS
Status: DISCONTINUED | OUTPATIENT
Start: 2022-01-12 | End: 2022-01-17 | Stop reason: HOSPADM

## 2022-01-12 RX ADMIN — DEXAMETHASONE 6 MG: 4 TABLET ORAL at 07:50

## 2022-01-12 RX ADMIN — Medication 50 MG: at 07:50

## 2022-01-12 RX ADMIN — INSULIN ASPART 1 UNITS: 100 INJECTION, SOLUTION INTRAVENOUS; SUBCUTANEOUS at 11:18

## 2022-01-12 RX ADMIN — ENOXAPARIN SODIUM 30 MG: 30 INJECTION SUBCUTANEOUS at 20:58

## 2022-01-12 RX ADMIN — ENOXAPARIN SODIUM 30 MG: 30 INJECTION SUBCUTANEOUS at 07:51

## 2022-01-12 RX ADMIN — Medication 500 MG: at 07:50

## 2022-01-12 RX ADMIN — INSULIN ASPART 1 UNITS: 100 INJECTION, SOLUTION INTRAVENOUS; SUBCUTANEOUS at 08:49

## 2022-01-12 RX ADMIN — Medication 25 MCG: at 07:50

## 2022-01-12 RX ADMIN — CEPHALEXIN 500 MG: 500 CAPSULE ORAL at 07:51

## 2022-01-12 RX ADMIN — CEPHALEXIN 500 MG: 500 CAPSULE ORAL at 20:58

## 2022-01-12 RX ADMIN — INSULIN ASPART 2 UNITS: 100 INJECTION, SOLUTION INTRAVENOUS; SUBCUTANEOUS at 17:29

## 2022-01-12 RX ADMIN — ASPIRIN 81 MG: 81 TABLET, COATED ORAL at 07:50

## 2022-01-12 RX ADMIN — MULTIPLE VITAMINS W/ MINERALS TAB 1 TABLET: TAB at 07:50

## 2022-01-12 ASSESSMENT — ACTIVITIES OF DAILY LIVING (ADL)
ADLS_ACUITY_SCORE: 9
ADLS_ACUITY_SCORE: 13
ADLS_ACUITY_SCORE: 9
DEPENDENT_IADLS:: INDEPENDENT
ADLS_ACUITY_SCORE: 13
ADLS_ACUITY_SCORE: 9

## 2022-01-12 ASSESSMENT — MIFFLIN-ST. JEOR: SCORE: 1229.79

## 2022-01-12 NOTE — ED NOTES
Oxygen saturations fell in mid 80's on 4L/NC.  Unable to get saturations up above 88%.  oximask applied at 10L and saturations up to 96%

## 2022-01-12 NOTE — PLAN OF CARE
Problem: Adult Inpatient Plan of Care  Goal: Plan of Care Review  Outcome: Improving  Flowsheets (Taken 1/12/2022 1318)  Plan of Care Reviewed With: patient  Goal: Patient-Specific Goal (Individualized)  Outcome: Improving  Goal: Absence of Hospital-Acquired Illness or Injury  Outcome: Improving  Intervention: Identify and Manage Fall Risk  Recent Flowsheet Documentation  Taken 1/12/2022 1205 by Laverne Scott RN  Safety Promotion/Fall Prevention: safety round/check completed  Taken 1/12/2022 0800 by Laverne Scott RN  Safety Promotion/Fall Prevention: safety round/check completed  Goal: Optimal Comfort and Wellbeing  Outcome: Improving   Pt is alert and oriented x3.pt is forgetful. Pt denies pain. Pt is med complaint. Pt's v/s stable. Pt is on 8 liter of O2 on oxymask sating 91-92%. Continue to monitor pt.

## 2022-01-12 NOTE — CONSULTS
Care Management Initial Consult    General Information  Assessment completed with: Children,  (daughter)  Type of CM/SW Visit: Initial Assessment    Primary Care Provider verified and updated as needed:     Readmission within the last 30 days: no previous admission in last 30 days      Reason for Consult: discharge planning  Advance Care Planning:            Communication Assessment  Patient's communication style: spoken language (English or Bilingual)    Hearing Difficulty or Deaf: no   Wear Glasses or Blind: yes    Cognitive  Cognitive/Neuro/Behavioral: WDL                      Living Environment:   People in home: alone     Current living Arrangements:        Able to return to prior arrangements: other (see comments)  Living Arrangement Comments:  (home care vs TCU)    Family/Social Support:  Care provided by: self  Provides care for: no one                Description of Support System:           Current Resources:   Patient receiving home care services: No     Community Resources: None  Equipment currently used at home: none  Supplies currently used at home: None    Employment/Financial:  Employment Status:          Financial Concerns:             Lifestyle & Psychosocial Needs:  Social Determinants of Health     Tobacco Use: Low Risk      Smoking Tobacco Use: Never Smoker     Smokeless Tobacco Use: Never Used   Alcohol Use: Not on file   Financial Resource Strain: Not on file   Food Insecurity: Not on file   Transportation Needs: Not on file   Physical Activity: Not on file   Stress: Not on file   Social Connections: Not on file   Intimate Partner Violence: Not on file   Depression: Not on file   Housing Stability: Not on file       Functional Status:  Prior to admission patient needed assistance:   Dependent ADLs:: Independent  Dependent IADLs:: Independent  Assesssment of Functional Status: Not at baseline with ADL Functioning    Mental Health Status:          Chemical Dependency Status:                 Values/Beliefs:  Spiritual, Cultural Beliefs, Confucianism Practices, Values that affect care:                 Additional Information:  AIDET completed. Writer spoke with patient's daughter Adelia: [phone] who lives in [place]. Patient + Covid 1/6/2022. She lives alone. Patient's other daughter lives out of state as well. Adelia would like updates and to know if possible 24 hours before her mom discharges so she or her sister can fly or drive here. Patient was independent, no DME or services. No oxygen. In hospital she had been on 10 L, now down to 4 L then 8 L per cannula.     Informed CM will follow. Family will transport at discharge. Depending on progression of care; TCU vs home care for patient. Oxygen assessment for home use? If 02 needs continue.     Teri Moe, ALEIDA, , ADNIEL    3939: Patient requesting help with her will and changing it. Very worked up and stressed. Gave patient Honoring choices and she refused to fill out. She wants a change to her will. She said it is old. Let patient know we don't help with walters, etc. Writer gave her paper and a pen so she could write her requests/changes on paper for the time being at least. Request [facility] to see patient.     Teri Moe RN

## 2022-01-12 NOTE — PROGRESS NOTES
[facility]    Medicine Progress Note - Hospitalist Service       Date of Admission:  1/11/2022  Principal Problem:    Pneumonia due to 2019 novel coronavirus  Active Problems:    Hypothyroidism    Transaminitis    Acute respiratory failure with hypoxia (H)     Assessment & Plan         76-year-old female with past medical history of hypothyroidism, hyperlipidemia, anxiety, and vaccinated against COVID-19 admitted with:      # Confirmed COVID-19 infection    # Acute Hypoxic Respiratory Failure secondary to COVID-19 infection  # Viral Pneumonia secondary to COVID-19 infection  # Transaminitis secondary to COVID 19      Symptom Onset 12/27/21   Date of 1st Positive Test 1/6/22   Vaccination Status Declines Vaccine       - COVID-19 special precautions, continuous pulse-ox  - Oxygen: continue current support with Venturi face mask at 10 L/min; titrate to keep SpO2 between 90-96%  - Labs: daily COVID labs ordered (CBC, creatinine, retic count, LDH, INR/PT, D-dimer, fibrinogen, troponin, CRP)   - Imaging: no additional imaging needed at this time  - Breathing treatments: no inhalers needed; avoid nebulizers in favor of MDIs   - IV fluids: not indicated at this time  - Antibiotics: indicated for treatment of UTIU   - COVID-Focused Medications: Dexamethasone 6 mg x 10 days or until hospital discharge, started on 1/11/22 and remdesivir not started due to abnormal LFTs  - DVT Prophylaxis: at high risk of thrombotic complications due to COVID-19 (DDimer = 7.04 ug/mL FEU (Ref range: 0.00 - 0.50 ug/mL FEU) ).          - PROPHYLACTIC dosing: lovenox 40mg daily        - consider anticoag on discharge for 30 days & until return to normal mobility    #Steroid-induced hyperglycemia  -Insulin sliding scale with meals    #UTI  - continue empiric Keflex pending urine Cx results     #Hyperlipidemia  -We will hold statin due to abnormal LFTs    #Hypothyroidism  -Continue levothyroxine       Diet: Regular Diet Adult  "   DVT Prophylaxis: Enoxaparin (Lovenox) SQ  Pineda Catheter: Not present  Central Lines: None  Code Status: No CPR- Do NOT Intubate      Disposition Plan   Expected Discharge: 01/16/2022  Anticipated discharge location:  Awaiting care coordination huddle TBD  Delays: Hypoxia       The patient's care was discussed with the Bedside Nurse and Patient. for total time 35 minutes with greater than 50% of total time spent in counseling and coordination of care.    Lilia Cortez MD  Hospitalist Service  [facility]  Securely message with the Vocera Web Console (learn more here)  Text page via [employer] Paging/Directory        Clinically Significant Risk Factors Present on Admission             # Overweight: last Body mass index is 25.55 kg/m .      ______________________________________________________________________    Interval History   Remainder of 12 point review of systems negative except as noted below    Subjective:  Patient continues to complain of extreme fatigue.  Denies feeling short of breath current amount of supplemental O2.  Has been coughing, mostly unproductive cough.  Has some chest pain with coughing.  Denies having any headache, body aches, nausea, abdominal pain or diarrhea        Data reviewed today: I reviewed all medications, new labs and imaging results over the last 24 hours.     Physical Exam   Vital Signs: Temp: 97.7  F (36.5  C) Temp src: Oral BP: 118/65 Pulse: 82   Resp: 22 SpO2: 94 % O2 Device: Oxymask Oxygen Delivery: 10 LPM  Weight: 158 lbs 4.8 oz  Physical Exam:  Temp:  [97.6  F (36.4  C)-97.8  F (36.6  C)] 97.7  F (36.5  C)  Pulse:  [63-85] 82  Resp:  [18-30] 22  BP: (118-149)/(62-73) 118/65  SpO2:  [91 %-95 %] 94 %    /65 (BP Location: Right arm)   Pulse 82   Temp 97.7  F (36.5  C) (Oral)   Resp 22   Ht 1.676 m (5' 6\")   Wt 71.8 kg (158 lb 4.8 oz)   SpO2 94%   BMI 25.55 kg/m    General appearance: alert, appears stated age and cooperative  Head: " Normocephalic, without obvious abnormality  Eyes: Clear conjuctiva  Neck: no JVD and supple, symmetrical, trachea midline  Lungs: clear to auscultation bilaterally  Heart: regular rate and rhythm, S1, S2 normal, no murmur, click, rub or gallop  Abdomen: soft, non-tender; bowel sounds normal; no masses,  no organomegaly  Extremities: no edema, redness or tenderness in the calves or thighs  Skin: Skin color, texture, turgor normal. No rashes or lesions  Neurologic: Grossly normal          Data   Recent Labs   Lab 01/12/22  1701 01/12/22  1117 01/12/22  0914 01/12/22  0848 01/11/22  1228 01/11/22  1201 01/06/22  1704   WBC  --   --  10.1  --   --  11.9* 6.3   HGB  --   --  12.5  --   --  12.9 14.2   MCV  --   --  84  --   --  83 81   PLT  --   --  526*  --   --  455* 259   INR  --   --  1.16*  --   --   --   --    NA  --   --  136  --   --  135* 133*   POTASSIUM  --   --  4.3  --   --  4.0 3.4*   CHLORIDE  --   --  102  --   --  101 99   CO2  --   --  21*  --   --  25 21*   BUN  --   --  18  --   --  12 16   CR  --   --  0.64  --   --  0.70 0.84   ANIONGAP  --   --  13  --   --  9 13   ASHLI  --   --  9.6  --   --  9.2 9.3   * 201* 146*   < >  --  130* 130*   ALBUMIN  --   --  2.5*  --  2.6*  --  3.6   PROTTOTAL  --   --  7.2  --  7.1  --  7.6   BILITOTAL  --   --  0.8  --  1.1*  --  1.1*   ALKPHOS  --   --  230*  --  264*  --  130*   ALT  --   --  144*  --  182*  --  109*   AST  --   --  67*  --  107*  --  132*    < > = values in this interval not displayed.     No results found for this or any previous visit (from the past 24 hour(s)).  Medications     - MEDICATION INSTRUCTIONS -         aspirin  81 mg Oral Once per day on Mon Wed Fri     cephALEXin  500 mg Oral BID     dexamethasone  6 mg Oral Daily     enoxaparin ANTICOAGULANT  0.5 mg/kg Subcutaneous BID     insulin aspart  1-3 Units Subcutaneous TID AC     multivitamin w/minerals  1 tablet Oral Daily     vitamin C  500 mg Oral Daily     Vitamin D3  25 mcg  Oral Daily     zinc gluconate  50 mg Oral Daily

## 2022-01-12 NOTE — PLAN OF CARE
Pt is alert and oriented x3. Pt's v/s stable. Pt will transfer to unit p1. Report was called to the nurse on p1.

## 2022-01-12 NOTE — ED NOTES
Salome Alicea because last shift got pt up out of bed and sats dropped into the 80's so she placed her on oximask 10L (53 to 85%) from

## 2022-01-12 NOTE — ED NOTES
Text paged Dr. Alicea and let him know pt has been weaned to 4 L  oxymask (33-65%) and is saturating 92-93% while sleeping.

## 2022-01-12 NOTE — PHARMACY-ADMISSION MEDICATION HISTORY
Pharmacy Note - Admission Medication History     Pertinent Provider Information: Keflex started today but pt never took yet, started vitamins recently for COVID      ______________________________________________________________________     Prior To Admission (PTA) med list completed and updated in EMR.               PTA Med List   Medication Sig Note Last Dose     acetaminophen (TYLENOL) 325 MG tablet Take 325-650 mg by mouth every 6 hours as needed for mild pain or fever   Past Week at Unknown time     aspirin 81 MG EC tablet Take 81 mg by mouth three times a week Mon, Wed, Fri   1/10/2022 at Unknown time     cephALEXin (KEFLEX) 500 MG capsule Take 500 mg by mouth 2 times daily Started 1/11/22 for 7 days, has not taken a dose yet   did not start yet     cholecalciferol 25 MCG (1000 UT) TABS Take 1 tablet by mouth daily   1/11/2022 at Unknown time     multivitamin w/minerals (MULTI-VITAMIN) tablet Take 1 tablet by mouth daily   1/11/2022 at Unknown time     ondansetron (ZOFRAN-ODT) 4 MG ODT tab Take 4 mg by mouth every 8 hours as needed for nausea   Past Week at Unknown time     potassium chloride ER (KLOR-CON M) 20 MEQ CR tablet Take 1 tablet (20 mEq) by mouth daily for 7 days 1/11/2022: 1/7/22 for 7 days 1/11/2022 at Unknown time     vitamin C (ASCORBIC ACID) 500 MG tablet Take 500 mg by mouth daily   1/11/2022 at Unknown time     zinc gluconate 50 MG tablet Take 50 mg by mouth daily   1/11/2022 at Unknown time        Information source(s): Patient and CareEverywhere/SureScripts  Method of interview communication: phone     Summary of Changes to PTA Med List  New: entire list    Patient was asked about OTC/herbal products specifically.  PTA med list reflects this.     In the past week, patient estimated taking medication this percent of the time:  greater than 90%.     Allergies were reviewed, assessed, and updated with the patient.       Patient does not use any multi-dose medications prior to admission.     The  information provided in this note is only as accurate as the sources available at the time of the update(s).     Thank you for the opportunity to participate in the care of this patient.     Deana Dowd [facility]  1/11/2022 12:44 PM

## 2022-01-13 ENCOUNTER — APPOINTMENT (OUTPATIENT)
Dept: OCCUPATIONAL THERAPY | Facility: HOSPITAL | Age: 77
DRG: 177 | End: 2022-01-13
Attending: INTERNAL MEDICINE
Payer: COMMERCIAL

## 2022-01-13 ENCOUNTER — APPOINTMENT (OUTPATIENT)
Dept: PHYSICAL THERAPY | Facility: HOSPITAL | Age: 77
DRG: 177 | End: 2022-01-13
Attending: INTERNAL MEDICINE
Payer: COMMERCIAL

## 2022-01-13 LAB
ALBUMIN SERPL-MCNC: 2.3 G/DL (ref 3.5–5)
ALP SERPL-CCNC: 202 U/L (ref 45–120)
ALT SERPL W P-5'-P-CCNC: 194 U/L (ref 0–45)
ANION GAP SERPL CALCULATED.3IONS-SCNC: 11 MMOL/L (ref 5–18)
AST SERPL W P-5'-P-CCNC: 124 U/L (ref 0–40)
BILIRUB DIRECT SERPL-MCNC: 0.3 MG/DL
BILIRUB SERPL-MCNC: 0.6 MG/DL (ref 0–1)
BUN SERPL-MCNC: 22 MG/DL (ref 8–28)
C REACTIVE PROTEIN LHE: 6.5 MG/DL (ref 0–0.8)
CALCIUM SERPL-MCNC: 9.2 MG/DL (ref 8.5–10.5)
CHLORIDE BLD-SCNC: 105 MMOL/L (ref 98–107)
CO2 SERPL-SCNC: 22 MMOL/L (ref 22–31)
CREAT SERPL-MCNC: 0.67 MG/DL (ref 0.6–1.1)
D DIMER PPP FEU-MCNC: 3.93 UG/ML FEU (ref 0–0.5)
ERYTHROCYTE [DISTWIDTH] IN BLOOD BY AUTOMATED COUNT: 13.5 % (ref 10–15)
FIBRINOGEN PPP-MCNC: 804 MG/DL (ref 170–490)
GFR SERPL CREATININE-BSD FRML MDRD: >90 ML/MIN/1.73M2
GLUCOSE BLD-MCNC: 159 MG/DL (ref 70–125)
GLUCOSE BLDC GLUCOMTR-MCNC: 148 MG/DL (ref 70–99)
GLUCOSE BLDC GLUCOMTR-MCNC: 169 MG/DL (ref 70–99)
GLUCOSE BLDC GLUCOMTR-MCNC: 183 MG/DL (ref 70–99)
GLUCOSE BLDC GLUCOMTR-MCNC: 211 MG/DL (ref 70–99)
HCT VFR BLD AUTO: 37 % (ref 35–47)
HGB BLD-MCNC: 12 G/DL (ref 11.7–15.7)
INR PPP: 1.1 (ref 0.9–1.15)
LDH SERPL L TO P-CCNC: 572 U/L (ref 125–220)
MCH RBC QN AUTO: 27.3 PG (ref 26.5–33)
MCHC RBC AUTO-ENTMCNC: 32.4 G/DL (ref 31.5–36.5)
MCV RBC AUTO: 84 FL (ref 78–100)
PLATELET # BLD AUTO: 588 10E3/UL (ref 150–450)
POTASSIUM BLD-SCNC: 4.4 MMOL/L (ref 3.5–5)
PROT SERPL-MCNC: 6.6 G/DL (ref 6–8)
RBC # BLD AUTO: 4.39 10E6/UL (ref 3.8–5.2)
SODIUM SERPL-SCNC: 138 MMOL/L (ref 136–145)
WBC # BLD AUTO: 12.4 10E3/UL (ref 4–11)

## 2022-01-13 PROCEDURE — 97110 THERAPEUTIC EXERCISES: CPT | Mod: GO

## 2022-01-13 PROCEDURE — 36415 COLL VENOUS BLD VENIPUNCTURE: CPT | Performed by: INTERNAL MEDICINE

## 2022-01-13 PROCEDURE — 250N000011 HC RX IP 250 OP 636: Performed by: INTERNAL MEDICINE

## 2022-01-13 PROCEDURE — 999N000157 HC STATISTIC RCP TIME EA 10 MIN

## 2022-01-13 PROCEDURE — 97535 SELF CARE MNGMENT TRAINING: CPT | Mod: GO

## 2022-01-13 PROCEDURE — 82248 BILIRUBIN DIRECT: CPT | Performed by: INTERNAL MEDICINE

## 2022-01-13 PROCEDURE — 250N000012 HC RX MED GY IP 250 OP 636 PS 637: Performed by: INTERNAL MEDICINE

## 2022-01-13 PROCEDURE — 97162 PT EVAL MOD COMPLEX 30 MIN: CPT | Mod: GP

## 2022-01-13 PROCEDURE — 97166 OT EVAL MOD COMPLEX 45 MIN: CPT | Mod: GO

## 2022-01-13 PROCEDURE — 85384 FIBRINOGEN ACTIVITY: CPT | Performed by: INTERNAL MEDICINE

## 2022-01-13 PROCEDURE — 120N000001 HC R&B MED SURG/OB

## 2022-01-13 PROCEDURE — 99233 SBSQ HOSP IP/OBS HIGH 50: CPT | Performed by: INTERNAL MEDICINE

## 2022-01-13 PROCEDURE — 86140 C-REACTIVE PROTEIN: CPT | Performed by: INTERNAL MEDICINE

## 2022-01-13 PROCEDURE — 85014 HEMATOCRIT: CPT | Performed by: INTERNAL MEDICINE

## 2022-01-13 PROCEDURE — 85610 PROTHROMBIN TIME: CPT | Performed by: INTERNAL MEDICINE

## 2022-01-13 PROCEDURE — 83615 LACTATE (LD) (LDH) ENZYME: CPT | Performed by: INTERNAL MEDICINE

## 2022-01-13 PROCEDURE — 85379 FIBRIN DEGRADATION QUANT: CPT | Performed by: INTERNAL MEDICINE

## 2022-01-13 PROCEDURE — 80053 COMPREHEN METABOLIC PANEL: CPT | Performed by: INTERNAL MEDICINE

## 2022-01-13 PROCEDURE — 97116 GAIT TRAINING THERAPY: CPT | Mod: GP

## 2022-01-13 PROCEDURE — 250N000013 HC RX MED GY IP 250 OP 250 PS 637: Performed by: INTERNAL MEDICINE

## 2022-01-13 RX ADMIN — Medication 25 MCG: at 08:56

## 2022-01-13 RX ADMIN — DEXAMETHASONE 6 MG: 4 TABLET ORAL at 08:55

## 2022-01-13 RX ADMIN — Medication 50 MG: at 08:56

## 2022-01-13 RX ADMIN — CEPHALEXIN 500 MG: 500 CAPSULE ORAL at 08:56

## 2022-01-13 RX ADMIN — INSULIN ASPART 1 UNITS: 100 INJECTION, SOLUTION INTRAVENOUS; SUBCUTANEOUS at 17:10

## 2022-01-13 RX ADMIN — Medication 500 MG: at 08:55

## 2022-01-13 RX ADMIN — MULTIPLE VITAMINS W/ MINERALS TAB 1 TABLET: TAB at 08:56

## 2022-01-13 RX ADMIN — ENOXAPARIN SODIUM 30 MG: 30 INJECTION SUBCUTANEOUS at 08:56

## 2022-01-13 RX ADMIN — INSULIN ASPART 1 UNITS: 100 INJECTION, SOLUTION INTRAVENOUS; SUBCUTANEOUS at 12:44

## 2022-01-13 RX ADMIN — INSULIN ASPART 1 UNITS: 100 INJECTION, SOLUTION INTRAVENOUS; SUBCUTANEOUS at 08:57

## 2022-01-13 RX ADMIN — HUMAN INSULIN 15 UNITS: 100 INJECTION, SUSPENSION SUBCUTANEOUS at 08:58

## 2022-01-13 RX ADMIN — ENOXAPARIN SODIUM 30 MG: 30 INJECTION SUBCUTANEOUS at 19:34

## 2022-01-13 RX ADMIN — CEPHALEXIN 500 MG: 500 CAPSULE ORAL at 19:34

## 2022-01-13 ASSESSMENT — ACTIVITIES OF DAILY LIVING (ADL)
ADLS_ACUITY_SCORE: 9
ADLS_ACUITY_SCORE: 9
ADLS_ACUITY_SCORE: 4
ADLS_ACUITY_SCORE: 9
ADLS_ACUITY_SCORE: 4
ADLS_ACUITY_SCORE: 4
ADLS_ACUITY_SCORE: 9
ADLS_ACUITY_SCORE: 13
ADLS_ACUITY_SCORE: 4
ADLS_ACUITY_SCORE: 9
ADLS_ACUITY_SCORE: 6
ADLS_ACUITY_SCORE: 4
ADLS_ACUITY_SCORE: 9
ADLS_ACUITY_SCORE: 4
ADLS_ACUITY_SCORE: 4
ADLS_ACUITY_SCORE: 9

## 2022-01-13 NOTE — PLAN OF CARE
Problem: Gas Exchange Impaired  Goal: Optimal Gas Exchange  Outcome: Improving   Pt on 10L O2 via oxymask. Sats around 94%. Lung sounds with some crackles on the right side. Productive cough. SBA to the bathroom. Denied any pain.

## 2022-01-13 NOTE — PROGRESS NOTES
Care Management Follow Up    Length of Stay (days): 2    Expected Discharge Date: 01/16/2022 (Pending response to treatment and medical needs)       Concerns to be Addressed:   Alteration in respiratory status due to hypoxic respiratory failure, secondary to COVID-19, requiring supplemental oxygen at 10 liters.    Patient plan of care discussed at interdisciplinary rounds: Yes    Anticipated Discharge Disposition:  Discharge goal is home pending response to treatment, medical needs and mobility closer to discharge.      Anticipated Discharge Services:  To be determined.   Anticipated Discharge DME:  To be determined.     Patient/family educated on Medicare website which has current facility and service quality ratings:  NA  Education Provided on the Discharge Plan:   Per team.  Patient/Family in Agreement with the Plan:  yes    Referrals Placed by CM/SW:  None  Private pay costs discussed: Not applicable at this time.     Additional Information:  Patient lives alone and is independent with activities of daily living at baseline. Her daughter Adelia lives in [place] but is primary family contact. She (or Adelia's sister) might come to [place] to stay with patient at her home if given at least 24 hour notice prior to discharge. Of note, patient has no history of having been vaccinated against Covid-19 on file. CM will continue to monitor progression of care, review team recommendations and provide discharge planning assist as needed.      Monique Ramsey RN

## 2022-01-13 NOTE — PLAN OF CARE
Problem: Gas Exchange Impaired  Goal: Optimal Gas Exchange  Outcome: No Change     Problem: Adult Inpatient Plan of Care  Goal: Readiness for Transition of Care  1/12/2022 1859 by Twan Moy, RN  Outcome: No Change  1/12/2022 1855 by Twan Moy, RN  Outcome: Improving     Problem: Adult Inpatient Plan of Care  Goal: Optimal Comfort and Wellbeing  1/12/2022 1859 by Twan Moy, RN  Outcome: Improving  1/12/2022 1855 by Twan Moy RN  Outcome: Improving   Pt remains on 10 Lpm O2 via nasal canula. Pt was able to ambulate safely to bathroom with stand by assistance. Pt's lung sounds are diminished. No reports of pain noted. Pt remains on covid precautions.   Twan Moy RN  1/12/2022  7:01 PM

## 2022-01-13 NOTE — PROGRESS NOTES
Instructed PT on how to use the flutter valve. Pt was able to give return demonstration with good technique.  Shaka Lai, RT

## 2022-01-13 NOTE — PROGRESS NOTES
01/13/22 1518   Quick Adds   Type of Visit Initial PT Evaluation   Living Environment   People in home alone   Current Living Arrangements house   Home Accessibility   (1 step in; can live on one level )   Transportation Anticipated car, drives self   Self-Care   Equipment Currently Used at Home none  (has sec)   Activity/Exercise/Self-Care Comment reports totally independent   Disability/Function   Fall history within last six months no   General Information   Onset of Illness/Injury or Date of Surgery 12/24/21   Referring Physician catalino ramirez   Patient/Family Therapy Goals Statement (PT) get well/home   Existing Precautions/Restrictions   (10 L O2)   Cognition   Orientation Status (Cognition) oriented x 4   Pain Assessment   Patient Currently in Pain No   Strength   Strength Comments   (general LE MMT's fair)   Bed Mobility   Comment (Bed Mobility) indep., easy (HOB sl. elevated at home too)   Transfers   Transfer Safety Comments indep.    Gait/Stairs (Locomotion)   Distance in Feet (Required for LE Total Joints) 12, 35, 32  (and stood in place for >5:00)   Comment (Gait/Stairs) see treatment sheet   Balance   Balance   (SLS each leg 3-4 sec.)   Sensory Examination   Sensory Perception patient reports no sensory changes   Clinical Impression   Criteria for Skilled Therapeutic Intervention yes, treatment indicated   PT Diagnosis (PT) impaired functional mobility   Influenced by the following impairments SOB/low activity tolerance   Functional limitations due to impairments amb.    Clinical Presentation Stable/Uncomplicated   Clinical Presentation Rationale presents as medically diagnosed   Clinical Decision Making (Complexity) low complexity   Therapy Frequency (PT) Daily   Predicted Duration of Therapy Intervention (days/wks) 1 wk   Planned Therapy Interventions (PT) home exercise program;gait training;progressive activity/exercise   Risk & Benefits of therapy have been explained evaluation/treatment  results reviewed   PT Discharge Planning    PT Discharge Recommendation (DC Rec) home with assist;home with home care physical therapy;Transitional Care Facility   PT Rationale for DC Rec would need sig. assist d/t poor act. tolerance, but quite steady on her feet   Total Evaluation Time   Total Evaluation Time (Minutes) 20

## 2022-01-13 NOTE — PROGRESS NOTES
[facility]    Medicine Progress Note - Hospitalist Service       Date of Admission:  1/11/2022  Principal Problem:    Pneumonia due to 2019 novel coronavirus  Active Problems:    Hypothyroidism    Transaminitis    Acute respiratory failure with hypoxia (H)     Assessment & Plan         76-year-old female with past medical history of hypothyroidism, hyperlipidemia, anxiety,  unvaccinated against COVID-19 admitted with:      # Confirmed COVID-19 infection    # Acute Hypoxic Respiratory Failure secondary to COVID-19 infection  # Viral Pneumonia secondary to COVID-19 infection  # Transaminitis secondary to COVID 19      Symptom Onset 12/27/21   Date of 1st Positive Test 1/6/22   Vaccination Status Declines Vaccine       - COVID-19 special precautions, continuous pulse-ox  - Oxygen: continue current support with Venturi face mask at 10 L/min; titrate to keep SpO2 between 90-96%  - Labs: daily COVID labs ordered (CBC, creatinine, retic count, LDH, INR/PT, D-dimer, fibrinogen, troponin, CRP)  - Imaging: no additional imaging needed at this time  - Breathing treatments: no inhalers needed; avoid nebulizers in favor of MDIs   - Encourage incentive spirometer, flutter valve, self proning as tolerated  - IV fluids: not indicated at this time  - Antibiotics: indicated for treatment of UTI   - COVID-Focused Medications: Dexamethasone 6 mg x 10 days or until hospital discharge, started on 1/11/22 and remdesivir not started due to abnormal LFTs and patient/family would decline it anyway   - DVT Prophylaxis: at high risk of thrombotic complications due to COVID-19 (DDimer = 7.04 ug/mL FEU (Ref range: 0.00 - 0.50 ug/mL FEU) ).          - PROPHYLACTIC dosing: lovenox 40mg daily        - consider anticoag on discharge for 30 days & until return to normal mobility    #Steroid-induced hyperglycemia  -Insulin sliding scale with meals  -Start NPH 15 units in the morning at the time dexamethasone is  "given    #E.coli UTI  - will  Complete 5 days of Keflex    #Hyperlipidemia  -We will hold statin due to abnormal LFTs    #Hypothyroidism  -Continue levothyroxine       Diet: Regular Diet Adult    DVT Prophylaxis: Enoxaparin (Lovenox) SQ  Pineda Catheter: Not present  Central Lines: None  Code Status: No CPR- Do NOT Intubate      Disposition Plan   Expected Discharge: 01/16/2022  Anticipated discharge location:  Awaiting care coordination huddle TBD  Delays: Hypoxia       The patient's care was discussed with the Bedside Nurse and Patient. for total time 35 minutes with greater than 50% of total time spent in counseling and coordination of care.    Lilia Cortez MD  Hospitalist Service  [facility]  Securely message with the Vocera Web Console (learn more here)  Text page via [employer] [email or web address]        Clinically Significant Risk Factors Present on Admission             # Overweight: last Body mass index is 25.55 kg/m .      ______________________________________________________________________    Interval History   Remainder of 12 point review of systems negative except as noted below    Subjective:  Patient states that her breathing is better today.  She continues to cough.  Appetite is not great.        Data reviewed today: I reviewed all medications, new labs and imaging results over the last 24 hours.     Physical Exam   Vital Signs: Temp: 97.9  F (36.6  C) Temp src: Oral BP: (!) 143/56 Pulse: 65   Resp: 20 SpO2: 95 % O2 Device: High Flow Nasal Cannula (HFNC) Oxygen Delivery: 10 LPM  Weight: 158 lbs 4.8 oz  Physical Exam:  Temp:  [97.3  F (36.3  C)-97.9  F (36.6  C)] 97.9  F (36.6  C)  Pulse:  [65-82] 65  Resp:  [18-22] 20  BP: (118-143)/(56-70) 143/56  SpO2:  [90 %-96 %] 95 %    BP (!) 143/56 (BP Location: Right arm)   Pulse 65   Temp 97.9  F (36.6  C) (Oral)   Resp 20   Ht 1.676 m (5' 6\")   Wt 71.8 kg (158 lb 4.8 oz)   SpO2 95%   BMI 25.55 kg/m    General appearance: " alert, appears stated age and cooperative  Head: Normocephalic, without obvious abnormality  Eyes: Clear conjuctiva  Neck: no JVD and supple, symmetrical, trachea midline  Lungs: clear to auscultation bilaterally  Heart: regular rate and rhythm, S1, S2 normal, no murmur, click, rub or gallop  Abdomen: soft, non-tender; bowel sounds normal; no masses,  no organomegaly  Extremities: no edema, redness or tenderness in the calves or thighs  Skin: Skin color, texture, turgor normal. No rashes or lesions  Neurologic: Grossly normal          Data   Recent Labs   Lab 01/13/22  1239 01/13/22  0747 01/13/22  0703 01/12/22  1117 01/12/22  0914 01/11/22  1228 01/11/22  1201   WBC  --   --  12.4*  --  10.1  --  11.9*   HGB  --   --  12.0  --  12.5  --  12.9   MCV  --   --  84  --  84  --  83   PLT  --   --  588*  --  526*  --  455*   INR  --   --  1.10  --  1.16*  --   --    NA  --   --  138  --  136  --  135*   POTASSIUM  --   --  4.4  --  4.3  --  4.0   CHLORIDE  --   --  105  --  102  --  101   CO2  --   --  22  --  21*  --  25   BUN  --   --  22  --  18  --  12   CR  --   --  0.67  --  0.64  --  0.70   ANIONGAP  --   --  11  --  13  --  9   ASHLI  --   --  9.2  --  9.6  --  9.2   * 169* 159*   < > 146*   < > 130*   ALBUMIN  --   --  2.3*  --  2.5*   < >  --    PROTTOTAL  --   --  6.6  --  7.2   < >  --    BILITOTAL  --   --  0.6  --  0.8   < >  --    ALKPHOS  --   --  202*  --  230*   < >  --    ALT  --   --  194*  --  144*   < >  --    AST  --   --  124*  --  67*   < >  --     < > = values in this interval not displayed.     No results found for this or any previous visit (from the past 24 hour(s)).  Medications     - MEDICATION INSTRUCTIONS -         aspirin  81 mg Oral Once per day on Mon Wed Fri     cephALEXin  500 mg Oral BID     dexamethasone  6 mg Oral Daily     enoxaparin ANTICOAGULANT  0.5 mg/kg Subcutaneous BID     insulin aspart  1-3 Units Subcutaneous TID AC     insulin NPH  15 Units Subcutaneous QAM AC      multivitamin w/minerals  1 tablet Oral Daily     vitamin C  500 mg Oral Daily     Vitamin D3  25 mcg Oral Daily     zinc gluconate  50 mg Oral Daily

## 2022-01-13 NOTE — PLAN OF CARE
Problem: Adult Inpatient Plan of Care  Goal: Plan of Care Review  Outcome: Improving  Goal: Patient-Specific Goal (Individualized)  Outcome: Improving  Goal: Absence of Hospital-Acquired Illness or Injury  Outcome: Improving  Intervention: Identify and Manage Fall Risk  Recent Flowsheet Documentation  Taken 1/13/2022 0900 by Laverne Scott RN  Safety Promotion/Fall Prevention: safety round/check completed  Goal: Optimal Comfort and Wellbeing  Outcome: Improving  Goal: Readiness for Transition of Care  Outcome: Improving   Pt is alert and oriented x4. Pt is med complaint. Pt denies pain. Pt's v/s stable. Pt is on 10 liter of o2 on oxymask. No complain. Continue to monitor pt.

## 2022-01-14 LAB
ANION GAP SERPL CALCULATED.3IONS-SCNC: 9 MMOL/L (ref 5–18)
BUN SERPL-MCNC: 20 MG/DL (ref 8–28)
C REACTIVE PROTEIN LHE: 3.1 MG/DL (ref 0–0.8)
CALCIUM SERPL-MCNC: 9 MG/DL (ref 8.5–10.5)
CHLORIDE BLD-SCNC: 106 MMOL/L (ref 98–107)
CO2 SERPL-SCNC: 24 MMOL/L (ref 22–31)
CREAT SERPL-MCNC: 0.62 MG/DL (ref 0.6–1.1)
D DIMER PPP FEU-MCNC: 3.06 UG/ML FEU (ref 0–0.5)
ERYTHROCYTE [DISTWIDTH] IN BLOOD BY AUTOMATED COUNT: 13.6 % (ref 10–15)
FIBRINOGEN PPP-MCNC: 699 MG/DL (ref 170–490)
GFR SERPL CREATININE-BSD FRML MDRD: >90 ML/MIN/1.73M2
GLUCOSE BLD-MCNC: 102 MG/DL (ref 70–125)
GLUCOSE BLDC GLUCOMTR-MCNC: 106 MG/DL (ref 70–99)
GLUCOSE BLDC GLUCOMTR-MCNC: 117 MG/DL (ref 70–99)
GLUCOSE BLDC GLUCOMTR-MCNC: 167 MG/DL (ref 70–99)
GLUCOSE BLDC GLUCOMTR-MCNC: 274 MG/DL (ref 70–99)
HCT VFR BLD AUTO: 37.3 % (ref 35–47)
HGB BLD-MCNC: 12.3 G/DL (ref 11.7–15.7)
INR PPP: 1.02 (ref 0.9–1.15)
LDH SERPL L TO P-CCNC: 466 U/L (ref 125–220)
MCH RBC QN AUTO: 28 PG (ref 26.5–33)
MCHC RBC AUTO-ENTMCNC: 33 G/DL (ref 31.5–36.5)
MCV RBC AUTO: 85 FL (ref 78–100)
PLATELET # BLD AUTO: 615 10E3/UL (ref 150–450)
POTASSIUM BLD-SCNC: 4.6 MMOL/L (ref 3.5–5)
RBC # BLD AUTO: 4.39 10E6/UL (ref 3.8–5.2)
SODIUM SERPL-SCNC: 139 MMOL/L (ref 136–145)
WBC # BLD AUTO: 11.3 10E3/UL (ref 4–11)

## 2022-01-14 PROCEDURE — 36415 COLL VENOUS BLD VENIPUNCTURE: CPT | Performed by: INTERNAL MEDICINE

## 2022-01-14 PROCEDURE — 86140 C-REACTIVE PROTEIN: CPT | Performed by: INTERNAL MEDICINE

## 2022-01-14 PROCEDURE — 85018 HEMOGLOBIN: CPT | Performed by: INTERNAL MEDICINE

## 2022-01-14 PROCEDURE — 80048 BASIC METABOLIC PNL TOTAL CA: CPT | Performed by: INTERNAL MEDICINE

## 2022-01-14 PROCEDURE — 85379 FIBRIN DEGRADATION QUANT: CPT | Performed by: INTERNAL MEDICINE

## 2022-01-14 PROCEDURE — 85610 PROTHROMBIN TIME: CPT | Performed by: INTERNAL MEDICINE

## 2022-01-14 PROCEDURE — 99207 PR CDG-CORRECTLY CODED, REVIEWED AND AGREE: CPT | Performed by: HOSPITALIST

## 2022-01-14 PROCEDURE — 250N000011 HC RX IP 250 OP 636: Performed by: INTERNAL MEDICINE

## 2022-01-14 PROCEDURE — 99232 SBSQ HOSP IP/OBS MODERATE 35: CPT | Performed by: HOSPITALIST

## 2022-01-14 PROCEDURE — 250N000013 HC RX MED GY IP 250 OP 250 PS 637: Performed by: INTERNAL MEDICINE

## 2022-01-14 PROCEDURE — 83615 LACTATE (LD) (LDH) ENZYME: CPT | Performed by: INTERNAL MEDICINE

## 2022-01-14 PROCEDURE — 85384 FIBRINOGEN ACTIVITY: CPT | Performed by: INTERNAL MEDICINE

## 2022-01-14 PROCEDURE — 120N000001 HC R&B MED SURG/OB

## 2022-01-14 RX ADMIN — Medication 50 MG: at 09:50

## 2022-01-14 RX ADMIN — ENOXAPARIN SODIUM 30 MG: 30 INJECTION SUBCUTANEOUS at 09:49

## 2022-01-14 RX ADMIN — CEPHALEXIN 500 MG: 500 CAPSULE ORAL at 09:50

## 2022-01-14 RX ADMIN — Medication 500 MG: at 11:12

## 2022-01-14 RX ADMIN — HUMAN INSULIN 15 UNITS: 100 INJECTION, SUSPENSION SUBCUTANEOUS at 09:51

## 2022-01-14 RX ADMIN — CEPHALEXIN 500 MG: 500 CAPSULE ORAL at 21:10

## 2022-01-14 RX ADMIN — Medication 25 MCG: at 09:51

## 2022-01-14 RX ADMIN — ENOXAPARIN SODIUM 30 MG: 30 INJECTION SUBCUTANEOUS at 21:10

## 2022-01-14 RX ADMIN — INSULIN ASPART 2 UNITS: 100 INJECTION, SOLUTION INTRAVENOUS; SUBCUTANEOUS at 17:04

## 2022-01-14 RX ADMIN — ASPIRIN 81 MG: 81 TABLET, COATED ORAL at 09:50

## 2022-01-14 RX ADMIN — MULTIPLE VITAMINS W/ MINERALS TAB 1 TABLET: TAB at 09:49

## 2022-01-14 RX ADMIN — DEXAMETHASONE 6 MG: 4 TABLET ORAL at 09:50

## 2022-01-14 ASSESSMENT — ACTIVITIES OF DAILY LIVING (ADL)
ADLS_ACUITY_SCORE: 6
ADLS_ACUITY_SCORE: 4
ADLS_ACUITY_SCORE: 6
ADLS_ACUITY_SCORE: 4
ADLS_ACUITY_SCORE: 6
ADLS_ACUITY_SCORE: 6
ADLS_ACUITY_SCORE: 4
ADLS_ACUITY_SCORE: 6
ADLS_ACUITY_SCORE: 4
ADLS_ACUITY_SCORE: 6
ADLS_ACUITY_SCORE: 6
ADLS_ACUITY_SCORE: 4
ADLS_ACUITY_SCORE: 6
ADLS_ACUITY_SCORE: 4
ADLS_ACUITY_SCORE: 4

## 2022-01-14 NOTE — PROGRESS NOTES
Hospitalist Progress Note    Assessment/Plan    Principal Problem:    Pneumonia due to 2019 novel coronavirus  Active Problems:    Hypothyroidism    Transaminitis    Acute respiratory failure with hypoxia (H)    76-year-old patient with past medical history of hyperlipidemia, anxiety, unvaccinated against COVID-19, presented with shortness of breath found to have COVID-19 infection      COVID-19 infection,  Acute respiratory failure with hypoxia secondary to COVID-19,  Viral pneumonia secondary to COVID-19,    Symptom onset,      December 27,    Date of first test positive       1/6/2022  Vaccination status               declined vaccine    Patient is on COVID-19 precautions, oxygen requirement continue the same 10 L/min through Venturi mask,  Patient is onantibiotic for treatment of UTI,  Dexamethasone 6 mg for 10 days started on January 11,  Remdesivir was not started due to abnormal liver function test and because family declined remdesivir as well      Transaminitis  Likely secondary to viral illness, will monitor      DVT prophylaxis  Patient is on Lovenox 40 mg daily consider anticoagulation for 30 days or until return to normal mobility upon discharge      Steroid-induced hyperglycemia  On insulin sliding scale with meals,  NPH 15 units in the morning at time of dexamethasone        E. coli UTI   Keflex for treatment of UTI      Hyperlipidemia  Holding statin due to abnormal liver function test    Hypothyroidism  Resume levothyroxine        Barriers to Discharge: High oxygen needs    Anticipated discharge date/Disposition: Likely home when oxygen needs are stable in a few days    Subjective  Patient was seen this morning she was in good spirits, tolerating her breakfast, oxygen needs has been stable    Objective    Vital signs in last 24 hours  Temp:  [97.3  F (36.3  C)-98.1  F (36.7  C)] 97.6  F (36.4  C)  Pulse:  [66-77] 77  Resp:  [18] 18  BP: (128-134)/(63-72) 133/63  FiO2 (%):  [93 %] 93 %  SpO2:  [92  %-94 %] 92 % [unfilled] O2 Device: Oxymask    Weight:   [unfilled] Weight change:     Intake/Output last 3 shifts  I/O last 3 completed shifts:  In: 350 [P.O.:350]  Out: -   Body mass index is 25.55 kg/m .    Physical Exam:  General Appearance: Alert, oriented x3, does not appear in distress.  HEENT: Normocephalic. No scleral icterus, . Mucous membranes moist.  Heart: :Regular rate and rhythm, normal S1 ,S2, No murmurs, no JVD, no pedal edema   Lungs: Clear to auscultation bilaterally. No wheezing or crackles  Abdomen: Soft, non tender, no rebound or rigidity, non distended, bowel sounds present.  Extremity: No deformity. No joint swelling.      Pertinent Labs   Lab Results: personally reviewed.   Recent Labs   Lab 01/14/22  0720 01/13/22  0703 01/12/22  0914 01/11/22  1228    138 136  --    CO2 24 22 21*  --    BUN 20 22 18  --    ALBUMIN  --  2.3* 2.5* 2.6*   ALKPHOS  --  202* 230* 264*   ALT  --  194* 144* 182*   AST  --  124* 67* 107*     Recent Labs   Lab 01/14/22  0720 01/13/22  0703 01/12/22  0914   WBC 11.3* 12.4* 10.1   HGB 12.3 12.0 12.5   HCT 37.3 37.0 38.1   * 588* 526*     Recent Labs   Lab 01/11/22  1201   TROPONINI <0.01     Invalid input(s): POCGLUFGR              Advanced Care Planning:  Discharge Planning discussed with patient  Total time with this patient is 25 min with 50% of time spent in examining the patient, reviewing records, discussing plan of care and counseling, 50% of time spent in coordination of care.  Care discussed and coordinated with ALEIDA.      Sandy Colin MD  Internal Medicine Hospitalist  1/14/2022

## 2022-01-14 NOTE — PLAN OF CARE
Pt remains in room, doing ok with isolation & calls for assist appropriately. Oxygen per nasal cannula with sats drop to 89% when she is talking too much or exerting a lot of energy.

## 2022-01-14 NOTE — PLAN OF CARE
Problem: Adult Inpatient Plan of Care  Goal: Plan of Care Review  Outcome: Improving     Problem: Gas Exchange Impaired  Goal: Optimal Gas Exchange  Outcome: Improving   Pt remains of 10L via oxymask. Some crackles noted to lungs.  Dyspnea with exertion. Denied any pain. Up with SBA

## 2022-01-14 NOTE — PLAN OF CARE
Problem: Adult Inpatient Plan of Care  Goal: Plan of Care Review  1/13/2022 1832 by Laverne Scott RN  Outcome: Improving  Flowsheets (Taken 1/13/2022 1832)  Plan of Care Reviewed With: patient  1/13/2022 1122 by Laverne Scott RN  Outcome: Improving  Goal: Patient-Specific Goal (Individualized)  1/13/2022 1832 by Laverne Scott RN  Outcome: Improving  1/13/2022 1122 by Laverne Scott RN  Outcome: Improving  Goal: Absence of Hospital-Acquired Illness or Injury  1/13/2022 1832 by Laverne Scott RN  Outcome: Improving  1/13/2022 1122 by Laverne Scott RN  Outcome: Improving  Intervention: Identify and Manage Fall Risk  Recent Flowsheet Documentation  Taken 1/13/2022 1600 by Laverne Scott RN  Safety Promotion/Fall Prevention: safety round/check completed  Taken 1/13/2022 0900 by Laverne Scott RN  Safety Promotion/Fall Prevention: safety round/check completed  Goal: Optimal Comfort and Wellbeing  1/13/2022 1832 by Laverne Scott RN  Outcome: Improving  1/13/2022 1122 by Laverne Scott RN  Outcome: Improving  Goal: Readiness for Transition of Care  Outcome: Improving   Pt is alert and oriented x4. Pt is med complaint. Pt denies pain. Pt is on 10 liter of o2 on oxymask and sating 91-93%. Pt's v/s stable. Continue to monitor pt.

## 2022-01-14 NOTE — PROGRESS NOTES
Care Management Follow Up    Length of Stay (days): 3    Expected Discharge Date: 01/16/2022 or 1/17/2022 (Pending response to treatment and medical needs)       Concerns to be Addressed:   Alteration in respiratory status due to hypoxic respiratory failure, secondary to COVID-19, requiring supplemental oxygen at 10 liters.    Patient plan of care discussed at interdisciplinary rounds: Yes    Anticipated Discharge Disposition:  Discharge goal is home pending response to treatment, medical needs and mobility closer to discharge.      Anticipated Discharge Services:  To be determined.   Anticipated Discharge DME:  To be determined.     Patient/family educated on Medicare website which has current facility and service quality ratings:  NA  Education Provided on the Discharge Plan:   Per team.  Patient/Family in Agreement with the Plan:  yes    Referrals Placed by CM/SW:  None  Private pay costs discussed: Not applicable at this time.     Additional Information:  Patient lives alone and is independent with activities of daily living at baseline. Her daughter Adelia lives in [place] but is primary family contact. She (or Adelia's sister) might come to [place] to stay with patient at her home if given at least 24 hour notice prior to discharge. Of note, patient has no history of having been vaccinated against Covid-19 on file. CM will continue to monitor progression of care, review team recommendations and provide discharge planning assist as needed.      Monique Ramsey RN

## 2022-01-15 LAB
ANION GAP SERPL CALCULATED.3IONS-SCNC: 11 MMOL/L (ref 5–18)
BUN SERPL-MCNC: 19 MG/DL (ref 8–28)
C REACTIVE PROTEIN LHE: 1.9 MG/DL (ref 0–0.8)
CALCIUM SERPL-MCNC: 8.2 MG/DL (ref 8.5–10.5)
CHLORIDE BLD-SCNC: 106 MMOL/L (ref 98–107)
CO2 SERPL-SCNC: 22 MMOL/L (ref 22–31)
CREAT SERPL-MCNC: 0.61 MG/DL (ref 0.6–1.1)
D DIMER PPP FEU-MCNC: 3.13 UG/ML FEU (ref 0–0.5)
ERYTHROCYTE [DISTWIDTH] IN BLOOD BY AUTOMATED COUNT: 13.4 % (ref 10–15)
FIBRINOGEN PPP-MCNC: 654 MG/DL (ref 170–490)
GFR SERPL CREATININE-BSD FRML MDRD: >90 ML/MIN/1.73M2
GLUCOSE BLD-MCNC: 80 MG/DL (ref 70–125)
GLUCOSE BLDC GLUCOMTR-MCNC: 103 MG/DL (ref 70–99)
GLUCOSE BLDC GLUCOMTR-MCNC: 152 MG/DL (ref 70–99)
GLUCOSE BLDC GLUCOMTR-MCNC: 175 MG/DL (ref 70–99)
GLUCOSE BLDC GLUCOMTR-MCNC: 200 MG/DL (ref 70–99)
HCT VFR BLD AUTO: 38.6 % (ref 35–47)
HGB BLD-MCNC: 12.4 G/DL (ref 11.7–15.7)
INR PPP: 1.05 (ref 0.9–1.15)
LDH SERPL L TO P-CCNC: 475 U/L (ref 125–220)
MCH RBC QN AUTO: 27.6 PG (ref 26.5–33)
MCHC RBC AUTO-ENTMCNC: 32.1 G/DL (ref 31.5–36.5)
MCV RBC AUTO: 86 FL (ref 78–100)
PLATELET # BLD AUTO: 629 10E3/UL (ref 150–450)
POTASSIUM BLD-SCNC: 5.2 MMOL/L (ref 3.5–5)
RBC # BLD AUTO: 4.5 10E6/UL (ref 3.8–5.2)
SODIUM SERPL-SCNC: 139 MMOL/L (ref 136–145)
WBC # BLD AUTO: 10.1 10E3/UL (ref 4–11)

## 2022-01-15 PROCEDURE — 85379 FIBRIN DEGRADATION QUANT: CPT | Performed by: INTERNAL MEDICINE

## 2022-01-15 PROCEDURE — 250N000013 HC RX MED GY IP 250 OP 250 PS 637: Performed by: INTERNAL MEDICINE

## 2022-01-15 PROCEDURE — 83615 LACTATE (LD) (LDH) ENZYME: CPT | Performed by: INTERNAL MEDICINE

## 2022-01-15 PROCEDURE — 85384 FIBRINOGEN ACTIVITY: CPT | Performed by: INTERNAL MEDICINE

## 2022-01-15 PROCEDURE — 99207 PR CDG-MDM COMPONENT: MEETS MODERATE - DOWN CODED: CPT | Performed by: INTERNAL MEDICINE

## 2022-01-15 PROCEDURE — 250N000011 HC RX IP 250 OP 636: Performed by: INTERNAL MEDICINE

## 2022-01-15 PROCEDURE — 86140 C-REACTIVE PROTEIN: CPT | Performed by: INTERNAL MEDICINE

## 2022-01-15 PROCEDURE — 80048 BASIC METABOLIC PNL TOTAL CA: CPT | Performed by: INTERNAL MEDICINE

## 2022-01-15 PROCEDURE — 36415 COLL VENOUS BLD VENIPUNCTURE: CPT | Performed by: INTERNAL MEDICINE

## 2022-01-15 PROCEDURE — 85014 HEMATOCRIT: CPT | Performed by: INTERNAL MEDICINE

## 2022-01-15 PROCEDURE — 120N000001 HC R&B MED SURG/OB

## 2022-01-15 PROCEDURE — 85610 PROTHROMBIN TIME: CPT | Performed by: INTERNAL MEDICINE

## 2022-01-15 PROCEDURE — 99232 SBSQ HOSP IP/OBS MODERATE 35: CPT | Performed by: INTERNAL MEDICINE

## 2022-01-15 RX ADMIN — INSULIN ASPART 1 UNITS: 100 INJECTION, SOLUTION INTRAVENOUS; SUBCUTANEOUS at 17:41

## 2022-01-15 RX ADMIN — DEXAMETHASONE 6 MG: 4 TABLET ORAL at 09:16

## 2022-01-15 RX ADMIN — CEPHALEXIN 500 MG: 500 CAPSULE ORAL at 09:16

## 2022-01-15 RX ADMIN — Medication 50 MG: at 09:17

## 2022-01-15 RX ADMIN — ENOXAPARIN SODIUM 30 MG: 30 INJECTION SUBCUTANEOUS at 09:17

## 2022-01-15 RX ADMIN — HUMAN INSULIN 15 UNITS: 100 INJECTION, SUSPENSION SUBCUTANEOUS at 09:21

## 2022-01-15 RX ADMIN — CEPHALEXIN 500 MG: 500 CAPSULE ORAL at 20:28

## 2022-01-15 RX ADMIN — INSULIN ASPART 1 UNITS: 100 INJECTION, SOLUTION INTRAVENOUS; SUBCUTANEOUS at 12:43

## 2022-01-15 RX ADMIN — Medication 25 MCG: at 09:15

## 2022-01-15 RX ADMIN — ENOXAPARIN SODIUM 30 MG: 30 INJECTION SUBCUTANEOUS at 20:28

## 2022-01-15 RX ADMIN — MULTIPLE VITAMINS W/ MINERALS TAB 1 TABLET: TAB at 09:15

## 2022-01-15 ASSESSMENT — ACTIVITIES OF DAILY LIVING (ADL)
ADLS_ACUITY_SCORE: 8
ADLS_ACUITY_SCORE: 4
ADLS_ACUITY_SCORE: 6
ADLS_ACUITY_SCORE: 8
ADLS_ACUITY_SCORE: 6
ADLS_ACUITY_SCORE: 4
ADLS_ACUITY_SCORE: 4
ADLS_ACUITY_SCORE: 8
ADLS_ACUITY_SCORE: 4
ADLS_ACUITY_SCORE: 8
ADLS_ACUITY_SCORE: 4
ADLS_ACUITY_SCORE: 6
ADLS_ACUITY_SCORE: 8
ADLS_ACUITY_SCORE: 4
ADLS_ACUITY_SCORE: 8
ADLS_ACUITY_SCORE: 8
ADLS_ACUITY_SCORE: 4
ADLS_ACUITY_SCORE: 8
ADLS_ACUITY_SCORE: 4
ADLS_ACUITY_SCORE: 4
ADLS_ACUITY_SCORE: 8

## 2022-01-15 NOTE — PLAN OF CARE
Problem: Gas Exchange Impaired  Goal: Optimal Gas Exchange  Outcome: Improving     Problem: Adult Inpatient Plan of Care  Goal: Optimal Comfort and Wellbeing  Outcome: Improving   Pt is alert oriented x4. Denies pain or discomfort. Up to bathroom independent/SBA. Maintaining O2 sats in mid 90's at rest on 10L O2. Sl dyspnea with activity with sats dropping  to 89% but bounces back to 90's when back in bed and settled. Currently in bed resting. Will cont to monitor.

## 2022-01-15 NOTE — PLAN OF CARE
Problem: Gas Exchange Impaired  Goal: Optimal Gas Exchange  Outcome: No Change  Intervention: Optimize Oxygenation and Ventilation  Recent Flowsheet Documentation  Taken 1/15/2022 0948 by Michael Adame RN  Head of Bed (HOB) Positioning: HOB at 20-30 degrees   Patient has remained stable for the shift with her oxygenation. Patient does drop in O2 saturation when she gets up to the bathroom, and also when she is talking to staff but quickly returns back to the lower 90's. Patient has not complained of any difficulty breathing or pain related to breathing. Patient has continued to use her incentive spirometer.       Problem: Risk for Delirium  Goal: Optimal Coping  Outcome: No Change   Patient has expressed feeling anxious to get home as she stated she has a lot to get done at her house. Patient also stated understanding that even if she were to go home now that she would not be able to perform some of these tasks due to her oxygen level dropping when active.     Later, the patient had stated that she had been feeling very worried about still being on 10 liters of oxygen and was worried that she was not getting better after having started her steroid treatment. Writer had explained to her that this will take some time to heal and that all covid patients respond differently to treatments and the virus itself. Patient stated feeling comfort in knowing that she wasn't declining and stated feeling better after talking about it.    Titration attempted this evening as patient expressed concern of the rate of O2 she was on. Patient was able to drop down to 5 liters without dropping saturations at rest. Patient was also able to ambulate to the toilet on 5 liters without dropping in saturation.

## 2022-01-15 NOTE — PLAN OF CARE
Problem: Adult Inpatient Plan of Care  Goal: Optimal Comfort and Wellbeing  1/15/2022 0739 by Sara Bangura RN  Outcome: Improving     Problem: Gas Exchange Impaired  Goal: Optimal Gas Exchange  1/15/2022 0739 by Sara Bangura RN  Outcome: Improving  1/14/2022 2355 by Sara Bangura RN  Outcome: Improving  Intervention: Optimize Oxygenation and Ventilation  Recent Flowsheet Documentation  Taken 1/15/2022 0430 by Sara Bangura RN  Head of Bed (HOB) Positioning: HOB at 20-30 degrees  Taken 1/15/2022 0015 by Sara Bangura RN  Head of Bed (HOB) Positioning: HOB at 20-30 degrees  Taken 1/14/2022 2030 by Sara Bangura RN  Head of Bed (HOB) Positioning: HOB at 20-30 degrees   Pt remained on O2 10L all night with 02 sats in the mid 90's . Sats down to 89% briefly post bathroom usage but bounces back to mid 90's once settled. Denies respiratory distress or pain. Pt slept well most part of the night. Will cont to monitor. VS and resp status.

## 2022-01-16 ENCOUNTER — APPOINTMENT (OUTPATIENT)
Dept: OCCUPATIONAL THERAPY | Facility: HOSPITAL | Age: 77
DRG: 177 | End: 2022-01-16
Payer: COMMERCIAL

## 2022-01-16 ENCOUNTER — APPOINTMENT (OUTPATIENT)
Dept: PHYSICAL THERAPY | Facility: HOSPITAL | Age: 77
DRG: 177 | End: 2022-01-16
Payer: COMMERCIAL

## 2022-01-16 LAB
ALBUMIN SERPL-MCNC: 2.5 G/DL (ref 3.5–5)
ALP SERPL-CCNC: 178 U/L (ref 45–120)
ALT SERPL W P-5'-P-CCNC: 169 U/L (ref 0–45)
ANION GAP SERPL CALCULATED.3IONS-SCNC: 10 MMOL/L (ref 5–18)
AST SERPL W P-5'-P-CCNC: 49 U/L (ref 0–40)
BILIRUB DIRECT SERPL-MCNC: 0.2 MG/DL
BILIRUB SERPL-MCNC: 0.5 MG/DL (ref 0–1)
BUN SERPL-MCNC: 19 MG/DL (ref 8–28)
CALCIUM SERPL-MCNC: 9.1 MG/DL (ref 8.5–10.5)
CHLORIDE BLD-SCNC: 104 MMOL/L (ref 98–107)
CO2 SERPL-SCNC: 24 MMOL/L (ref 22–31)
CREAT SERPL-MCNC: 0.61 MG/DL (ref 0.6–1.1)
ERYTHROCYTE [DISTWIDTH] IN BLOOD BY AUTOMATED COUNT: 13.3 % (ref 10–15)
GFR SERPL CREATININE-BSD FRML MDRD: >90 ML/MIN/1.73M2
GLUCOSE BLD-MCNC: 92 MG/DL (ref 70–125)
GLUCOSE BLDC GLUCOMTR-MCNC: 133 MG/DL (ref 70–99)
GLUCOSE BLDC GLUCOMTR-MCNC: 167 MG/DL (ref 70–99)
GLUCOSE BLDC GLUCOMTR-MCNC: 258 MG/DL (ref 70–99)
GLUCOSE BLDC GLUCOMTR-MCNC: 98 MG/DL (ref 70–99)
HCT VFR BLD AUTO: 38.9 % (ref 35–47)
HGB BLD-MCNC: 12.6 G/DL (ref 11.7–15.7)
MCH RBC QN AUTO: 27.7 PG (ref 26.5–33)
MCHC RBC AUTO-ENTMCNC: 32.4 G/DL (ref 31.5–36.5)
MCV RBC AUTO: 86 FL (ref 78–100)
PLATELET # BLD AUTO: 677 10E3/UL (ref 150–450)
POTASSIUM BLD-SCNC: 4.4 MMOL/L (ref 3.5–5)
PROT SERPL-MCNC: 6.5 G/DL (ref 6–8)
RBC # BLD AUTO: 4.55 10E6/UL (ref 3.8–5.2)
SODIUM SERPL-SCNC: 138 MMOL/L (ref 136–145)
WBC # BLD AUTO: 10.6 10E3/UL (ref 4–11)

## 2022-01-16 PROCEDURE — 99232 SBSQ HOSP IP/OBS MODERATE 35: CPT | Performed by: INTERNAL MEDICINE

## 2022-01-16 PROCEDURE — 36415 COLL VENOUS BLD VENIPUNCTURE: CPT | Performed by: INTERNAL MEDICINE

## 2022-01-16 PROCEDURE — 85014 HEMATOCRIT: CPT | Performed by: INTERNAL MEDICINE

## 2022-01-16 PROCEDURE — 97110 THERAPEUTIC EXERCISES: CPT | Mod: GP

## 2022-01-16 PROCEDURE — 82310 ASSAY OF CALCIUM: CPT | Performed by: INTERNAL MEDICINE

## 2022-01-16 PROCEDURE — 97535 SELF CARE MNGMENT TRAINING: CPT | Mod: GO

## 2022-01-16 PROCEDURE — 99207 PR CDG-MDM COMPONENT: MEETS MODERATE - DOWN CODED: CPT | Performed by: INTERNAL MEDICINE

## 2022-01-16 PROCEDURE — 250N000011 HC RX IP 250 OP 636: Performed by: INTERNAL MEDICINE

## 2022-01-16 PROCEDURE — 82248 BILIRUBIN DIRECT: CPT | Performed by: INTERNAL MEDICINE

## 2022-01-16 PROCEDURE — 250N000013 HC RX MED GY IP 250 OP 250 PS 637: Performed by: INTERNAL MEDICINE

## 2022-01-16 PROCEDURE — 97110 THERAPEUTIC EXERCISES: CPT | Mod: GO

## 2022-01-16 PROCEDURE — 97116 GAIT TRAINING THERAPY: CPT | Mod: GP

## 2022-01-16 PROCEDURE — 97530 THERAPEUTIC ACTIVITIES: CPT | Mod: GP

## 2022-01-16 PROCEDURE — 120N000001 HC R&B MED SURG/OB

## 2022-01-16 RX ADMIN — ENOXAPARIN SODIUM 30 MG: 30 INJECTION SUBCUTANEOUS at 20:39

## 2022-01-16 RX ADMIN — HUMAN INSULIN 15 UNITS: 100 INJECTION, SUSPENSION SUBCUTANEOUS at 08:38

## 2022-01-16 RX ADMIN — ENOXAPARIN SODIUM 30 MG: 30 INJECTION SUBCUTANEOUS at 08:37

## 2022-01-16 RX ADMIN — Medication 50 MG: at 08:29

## 2022-01-16 RX ADMIN — DEXAMETHASONE 6 MG: 4 TABLET ORAL at 08:28

## 2022-01-16 RX ADMIN — INSULIN ASPART 2 UNITS: 100 INJECTION, SOLUTION INTRAVENOUS; SUBCUTANEOUS at 17:23

## 2022-01-16 RX ADMIN — CEPHALEXIN 500 MG: 500 CAPSULE ORAL at 08:29

## 2022-01-16 RX ADMIN — MULTIPLE VITAMINS W/ MINERALS TAB 1 TABLET: TAB at 08:30

## 2022-01-16 RX ADMIN — CEPHALEXIN 500 MG: 500 CAPSULE ORAL at 20:38

## 2022-01-16 RX ADMIN — Medication 25 MCG: at 08:29

## 2022-01-16 ASSESSMENT — ACTIVITIES OF DAILY LIVING (ADL)
ADLS_ACUITY_SCORE: 6

## 2022-01-16 NOTE — PLAN OF CARE
Problem: Gas Exchange Impaired  Goal: Optimal Gas Exchange  Outcome: Improving   Patient continues to be on oxygen at 5LPM NC with oxygen saturation in the low to mid 90s. Denied SOB. Compliant with doing deep breathes and  using incentive spirometer.     Problem: Adult Inpatient Plan of Care  Goal: Optimal Comfort and Wellbeing  Outcome: Improving   Patient denied pain.

## 2022-01-16 NOTE — PROGRESS NOTES
[facility]    Medicine Progress Note - Hospitalist Service       Date of Admission:  1/11/2022    Assessment & Plan          Principal Problem:    Pneumonia due to 2019 novel coronavirus  Active Problems:    Transaminitis    Acute respiratory failure with hypoxia (H)     76-year-old patient with past medical history of hyperlipidemia, anxiety, unvaccinated against COVID-19, presented with shortness of breath found to have COVID-19 infection     COVID-19 infection,  Acute respiratory failure with hypoxia secondary to COVID-19  Viral pneumonia secondary to COVID-19  -Symptom onset: December 27  - Date of first test positive: 1/6/2022  - Vaccination status: declined vaccine  - Patient is on COVID-19 precautions, oxygen requirement continue the same 10 L/min through Venturi mask,  - Patient is onantibiotic for treatment of UTI,  - Dexamethasone 6 mg for 10 days started on January 11,  - Remdesivir was not started due to abnormal liver function test and because family declined remdesivir as well, did speak with daughter today regarding remdesivir and gave them a little bit more information she will speak with her mom and discuss whether they want to start this medication.  They would be willing to do monoclonal antibodies if she had worsening oxygenation needs.  They will also be discussing temporary intubation but have not decided yet and will address this with them tomorrow.     Transaminitis  - Likely secondary to viral illness, will monitor     DVT prophylaxis  - Patient is on Lovenox 40 mg daily consider anticoagulation for 30 days or until return to normal mobility upon discharge     Steroid-induced hyperglycemia  - On insulin sliding scale with meals,  - NPH 15 units in the morning at time of dexamethasone     E. coli UTI   - Keflex for treatment of UTI for a total of 7 days     Hyperlipidemia  - Holding statin due to abnormal liver function test     Diet: Regular Diet Adult    DVT  Prophylaxis: Enoxaparin (Lovenox) SQ  Pineda Catheter: Not present  Central Lines: None  Code Status: No CPR- Do NOT Intubate      Disposition Plan   Expected Discharge: 01/16/2022     Anticipated discharge location:  Awaiting care coordination huddle  Delays:     Covid Test Positive  Oxygen Needs            The patient's care was discussed with the Patient and Patient's Family.    Itzel Brown MD  Hospitalist Service  [facility]  Securely message with the Vocera Web Console (learn more here)  Text page via [employer] Paging/Directory        Clinically Significant Risk Factors Present on Admission                    ______________________________________________________________________    Interval History   Ms. Mendez is doing well today.  She says that she is having no complaints other than an intolerance to activity.  She states that when she tries to do anything other than laying in bed she has difficulty breathing.  Otherwise she is having no complaints and is overall feeling better.  We did discuss her oxygen needs as she is requiring 10 L at baseline with desaturations during conversation into the upper 80s.  I also discussed this with her daughter who will be talking to her about taking remdesivir and if things worsen possible Tocilizumab.    Data reviewed today: I reviewed all medications, new labs and imaging results over the last 24 hours. I personally reviewed no images or EKG's today.    Physical Exam   Vital Signs: Temp: 97.8  F (36.6  C) Temp src: Oral BP: 127/79 Pulse: 68   Resp: 18 SpO2: 95 % O2 Device: Nasal cannula Oxygen Delivery: 5 LPM  Weight: 158 lbs 4.8 oz  General Appearance: Awake, alert, in no acute distress  Respiratory: CTAB, no wheeze, nasal cannula in place at 10 L/min  Cardiovascular: RRR, no murmur noted  GI: soft, nontender, non distended, normal bowel sounds  Skin: no jaundice, no rash      Data   Recent Labs   Lab 01/15/22  1649 01/15/22  1240  01/15/22  0913 01/15/22  0746 01/14/22  0822 01/14/22  0720 01/13/22  0747 01/13/22  0703 01/12/22  1117 01/12/22  0914   WBC  --   --   --  10.1  --  11.3*  --  12.4*  --  10.1   HGB  --   --   --  12.4  --  12.3  --  12.0  --  12.5   MCV  --   --   --  86  --  85  --  84  --  84   PLT  --   --   --  629*  --  615*  --  588*  --  526*   INR  --   --   --  1.05  --  1.02  --  1.10  --  1.16*   NA  --   --   --  139  --  139  --  138  --  136   POTASSIUM  --   --   --  5.2*  --  4.6  --  4.4  --  4.3   CHLORIDE  --   --   --  106  --  106  --  105  --  102   CO2  --   --   --  22  --  24  --  22  --  21*   BUN  --   --   --  19  --  20  --  22  --  18   CR  --   --   --  0.61  --  0.62  --  0.67  --  0.64   ANIONGAP  --   --   --  11  --  9  --  11  --  13   ASHLI  --   --   --  8.2*  --  9.0  --  9.2  --  9.6   * 175* 103* 80   < > 102   < > 159*   < > 146*   ALBUMIN  --   --   --   --   --   --   --  2.3*  --  2.5*   PROTTOTAL  --   --   --   --   --   --   --  6.6  --  7.2   BILITOTAL  --   --   --   --   --   --   --  0.6  --  0.8   ALKPHOS  --   --   --   --   --   --   --  202*  --  230*   ALT  --   --   --   --   --   --   --  194*  --  144*   AST  --   --   --   --   --   --   --  124*  --  67*    < > = values in this interval not displayed.     Medications     - MEDICATION INSTRUCTIONS -         aspirin  81 mg Oral Once per day on Mon Wed Fri     cephALEXin  500 mg Oral BID     dexamethasone  6 mg Oral Daily     enoxaparin ANTICOAGULANT  0.5 mg/kg Subcutaneous BID     insulin aspart  1-3 Units Subcutaneous TID AC     insulin NPH  15 Units Subcutaneous QAM AC     multivitamin w/minerals  1 tablet Oral Daily     vitamin C  500 mg Oral Daily     Vitamin D3  25 mcg Oral Daily     zinc gluconate  50 mg Oral Daily

## 2022-01-16 NOTE — PROGRESS NOTES
Care Management Follow Up    Length of Stay (days): 5    Expected Discharge Date: 01/16/2022     Concerns to be Addressed: Covid and 02 progression     Patient plan of care discussed at interdisciplinary rounds: Yes    Anticipated Discharge Disposition:  Home      Anticipated Discharge Services:  TBD   Anticipated Discharge DME:  Possiblel 02     Patient/family educated on Medicare website which has current facility and service quality ratings:  Not at this time   Education Provided on the Discharge Plan:  Yes   Patient/Family in Agreement with the Plan:  Yes     Referrals Placed by CM/SW:  None a this time  Private pay costs discussed: N/A    Additional Information:  KELVIN received call from Pt's daughter- Glenna who lives in [place] and is here visiting and plans to drive back today.  Pt's daughter Liyah is flying in today from [place].  According to Glenna, [phone], Pt has been working with her  to have will updated.  Glenna states that the nurse assisted Pt with recording a video with Pt's desires.  Glenna will call  to see if this recording is adequate or if a Notary is needed.  Glenna will update CM if assistance with Notary is needed.   KELVIN confirmed with Pt that it is fine to talk with all her children and hopes to get out of hospital soon to make things more official.       FRANCESCA Aquino

## 2022-01-16 NOTE — PROGRESS NOTES
[facility]    Medicine Progress Note - Hospitalist Service       Date of Admission:  1/11/2022    Assessment & Plan             76-year-old patient with past medical history of hyperlipidemia, anxiety, unvaccinated against COVID-19, presented with shortness of breath found to have COVID-19 infection     COVID-19 infection,  Acute respiratory failure with hypoxia secondary to COVID-19  Viral pneumonia secondary to COVID-19  -Symptom onset: December 27  - Date of first test positive: 1/6/2022  - Vaccination status: declined vaccine  - Patient is on COVID-19 precautions, oxygen requirement 6 L/min via NC  - Dexamethasone 6 mg for 10 days started on January 11  -Remdesivir declined  -She had significantly improving oxygenation today and I anticipate she will be good for discharge in the next couple days.  She is having significant concerns about oxygenation when she leaves the hospital.  I did tell her that we will be doing a home oxygen test before she leaves and she will go with oxygen if she qualifies.     Transaminitis  - Likely secondary to viral illness, will monitor     DVT prophylaxis  - Patient is on Lovenox 40 mg daily consider anticoagulation for 30 days or until return to normal mobility upon discharge     Steroid-induced hyperglycemia  - On insulin sliding scale with meals,  - NPH 15 units in the morning at time of dexamethasone     E. coli UTI   - Keflex for treatment of UTI for a total of 7 days     Hyperlipidemia  - Holding statin due to abnormal liver function test       Diet: Regular Diet Adult    DVT Prophylaxis: Enoxaparin (Lovenox) SQ  Pineda Catheter: Not present  Central Lines: None  Code Status: No CPR- Do NOT Intubate      Disposition Plan   Expected Discharge: 01/17/2022     Anticipated discharge location:  Awaiting care coordination huddle  Delays:     Covid Test Positive  Oxygen Needs            The patient's care was discussed with the Bedside Nurse and  Patient.    Itzel Brown MD  Hospitalist Service  [facility]  Securely message with the [employer] Web Console (learn more here)  Text page via eTipping Paging/Directory      Interval History   Mrs. Mendez is doing better today and requiring significantly less oxygen down from 10 L/min to 6 L/min.  She is not having any symptoms other than dyspnea with exercise.  We had an extensive conversation today lasting greater than 35 minutes regarding her concerns for discharge.  With how well she is improving she will likely be able to be discharged within the next couple days however she was very nervous about this.  She does live alone and does not have anyone to check on her.  I did let her know that we will set her up with a PCP to see outpatient hopefully within the first week after discharge.  I also let her know that we will do a home oxygen test when she is ready for discharge and she will go home with oxygen if she qualifies for it.  I also let her know that the regular course of the disease.      Data reviewed today: I reviewed all medications, new labs and imaging results over the last 24 hours. I personally reviewed no images or EKG's today.    Physical Exam   Vital Signs: Temp: 97.6  F (36.4  C) Temp src: Oral BP: 133/69 Pulse: 94   Resp: 16 SpO2: 92 % O2 Device: None (Room air) Oxygen Delivery: 2 LPM  Weight: 158 lbs 4.8 oz  General Appearance: Awake, alert, in no acute distress  Respiratory: CTAB, no wheeze, nasal cannula in place at 2 L/min  Cardiovascular: RRR, no murmur noted  GI: soft, nontender, non distended, normal bowel sounds  Skin: no jaundice, no rash      Data   Recent Labs   Lab 01/16/22  1648 01/16/22  1232 01/16/22  0826 01/16/22  0709 01/15/22  0913 01/15/22  0746 01/14/22  0822 01/14/22  0720 01/13/22  0747 01/13/22  0703   WBC  --   --   --  10.6  --  10.1  --  11.3*  --  12.4*   HGB  --   --   --  12.6  --  12.4  --  12.3  --  12.0   MCV  --   --   --  86  --  86   --  85  --  84   PLT  --   --   --  677*  --  629*  --  615*  --  588*   INR  --   --   --   --   --  1.05  --  1.02  --  1.10   NA  --   --   --  138  --  139  --  139  --  138   POTASSIUM  --   --   --  4.4  --  5.2*  --  4.6  --  4.4   CHLORIDE  --   --   --  104  --  106  --  106  --  105   CO2  --   --   --  24  --  22  --  24  --  22   BUN  --   --   --  19  --  19  --  20  --  22   CR  --   --   --  0.61  --  0.61  --  0.62  --  0.67   ANIONGAP  --   --   --  10  --  11  --  9  --  11   ASHLI  --   --   --  9.1  --  8.2*  --  9.0  --  9.2   * 133* 98 92   < > 80   < > 102   < > 159*   ALBUMIN  --   --   --  2.5*  --   --   --   --   --  2.3*   PROTTOTAL  --   --   --  6.5  --   --   --   --   --  6.6   BILITOTAL  --   --   --  0.5  --   --   --   --   --  0.6   ALKPHOS  --   --   --  178*  --   --   --   --   --  202*   ALT  --   --   --  169*  --   --   --   --   --  194*   AST  --   --   --  49*  --   --   --   --   --  124*    < > = values in this interval not displayed.     Medications     - MEDICATION INSTRUCTIONS -         aspirin  81 mg Oral Once per day on Mon Wed Fri     cephALEXin  500 mg Oral BID     dexamethasone  6 mg Oral Daily     enoxaparin ANTICOAGULANT  0.5 mg/kg Subcutaneous BID     insulin aspart  1-3 Units Subcutaneous TID AC     insulin NPH  15 Units Subcutaneous QAM AC     multivitamin w/minerals  1 tablet Oral Daily     vitamin C  500 mg Oral Daily     Vitamin D3  25 mcg Oral Daily     zinc gluconate  50 mg Oral Daily

## 2022-01-16 NOTE — PLAN OF CARE
Problem: Gas Exchange Impaired  Goal: Optimal Gas Exchange  Outcome: Improving   Oxygen sats around 95% @ 5 lpm/nc.   Pt has a non-productive cough, denies shortness of breath. Saturations dip slightly upon ambulation; pt recovers easily.  Denies pain

## 2022-01-17 ENCOUNTER — APPOINTMENT (OUTPATIENT)
Dept: PHYSICAL THERAPY | Facility: HOSPITAL | Age: 77
DRG: 177 | End: 2022-01-17
Payer: COMMERCIAL

## 2022-01-17 VITALS
HEART RATE: 65 BPM | TEMPERATURE: 97.6 F | RESPIRATION RATE: 18 BRPM | OXYGEN SATURATION: 90 % | SYSTOLIC BLOOD PRESSURE: 132 MMHG | WEIGHT: 158.3 LBS | BODY MASS INDEX: 25.44 KG/M2 | HEIGHT: 66 IN | DIASTOLIC BLOOD PRESSURE: 71 MMHG

## 2022-01-17 PROBLEM — J96.01 ACUTE RESPIRATORY FAILURE WITH HYPOXIA (H): Status: RESOLVED | Noted: 2022-01-11 | Resolved: 2022-01-17

## 2022-01-17 LAB
ANION GAP SERPL CALCULATED.3IONS-SCNC: 13 MMOL/L (ref 5–18)
BUN SERPL-MCNC: 24 MG/DL (ref 8–28)
CALCIUM SERPL-MCNC: 9.2 MG/DL (ref 8.5–10.5)
CHLORIDE BLD-SCNC: 104 MMOL/L (ref 98–107)
CO2 SERPL-SCNC: 22 MMOL/L (ref 22–31)
CREAT SERPL-MCNC: 0.63 MG/DL (ref 0.6–1.1)
ERYTHROCYTE [DISTWIDTH] IN BLOOD BY AUTOMATED COUNT: 13.5 % (ref 10–15)
GFR SERPL CREATININE-BSD FRML MDRD: >90 ML/MIN/1.73M2
GLUCOSE BLD-MCNC: 89 MG/DL (ref 70–125)
GLUCOSE BLDC GLUCOMTR-MCNC: 120 MG/DL (ref 70–99)
GLUCOSE BLDC GLUCOMTR-MCNC: 94 MG/DL (ref 70–99)
HCT VFR BLD AUTO: 41.8 % (ref 35–47)
HGB BLD-MCNC: 13.2 G/DL (ref 11.7–15.7)
MCH RBC QN AUTO: 26.9 PG (ref 26.5–33)
MCHC RBC AUTO-ENTMCNC: 31.6 G/DL (ref 31.5–36.5)
MCV RBC AUTO: 85 FL (ref 78–100)
PLATELET # BLD AUTO: 769 10E3/UL (ref 150–450)
POTASSIUM BLD-SCNC: 4.5 MMOL/L (ref 3.5–5)
RBC # BLD AUTO: 4.91 10E6/UL (ref 3.8–5.2)
SODIUM SERPL-SCNC: 139 MMOL/L (ref 136–145)
WBC # BLD AUTO: 10.5 10E3/UL (ref 4–11)

## 2022-01-17 PROCEDURE — 99239 HOSP IP/OBS DSCHRG MGMT >30: CPT | Performed by: INTERNAL MEDICINE

## 2022-01-17 PROCEDURE — 97116 GAIT TRAINING THERAPY: CPT | Mod: GP | Performed by: PHYSICAL THERAPIST

## 2022-01-17 PROCEDURE — 250N000011 HC RX IP 250 OP 636: Performed by: INTERNAL MEDICINE

## 2022-01-17 PROCEDURE — 250N000013 HC RX MED GY IP 250 OP 250 PS 637: Performed by: INTERNAL MEDICINE

## 2022-01-17 PROCEDURE — 85027 COMPLETE CBC AUTOMATED: CPT | Performed by: INTERNAL MEDICINE

## 2022-01-17 PROCEDURE — 97530 THERAPEUTIC ACTIVITIES: CPT | Mod: GP | Performed by: PHYSICAL THERAPIST

## 2022-01-17 PROCEDURE — 36415 COLL VENOUS BLD VENIPUNCTURE: CPT | Performed by: INTERNAL MEDICINE

## 2022-01-17 PROCEDURE — 82374 ASSAY BLOOD CARBON DIOXIDE: CPT | Performed by: INTERNAL MEDICINE

## 2022-01-17 RX ORDER — DEXAMETHASONE 6 MG/1
6 TABLET ORAL DAILY
Qty: 3 TABLET | Refills: 0 | Status: SHIPPED | OUTPATIENT
Start: 2022-01-18 | End: 2022-01-20

## 2022-01-17 RX ORDER — CEPHALEXIN 500 MG/1
500 CAPSULE ORAL 2 TIMES DAILY
Qty: 2 CAPSULE | Refills: 0 | Status: SHIPPED | OUTPATIENT
Start: 2022-01-17 | End: 2022-01-18

## 2022-01-17 RX ADMIN — ENOXAPARIN SODIUM 30 MG: 30 INJECTION SUBCUTANEOUS at 09:45

## 2022-01-17 RX ADMIN — CEPHALEXIN 500 MG: 500 CAPSULE ORAL at 09:44

## 2022-01-17 RX ADMIN — HUMAN INSULIN 15 UNITS: 100 INJECTION, SUSPENSION SUBCUTANEOUS at 09:48

## 2022-01-17 RX ADMIN — Medication 50 MG: at 09:44

## 2022-01-17 RX ADMIN — ASPIRIN 81 MG: 81 TABLET, COATED ORAL at 09:44

## 2022-01-17 RX ADMIN — MULTIPLE VITAMINS W/ MINERALS TAB 1 TABLET: TAB at 09:44

## 2022-01-17 RX ADMIN — Medication 25 MCG: at 09:43

## 2022-01-17 RX ADMIN — DEXAMETHASONE 6 MG: 4 TABLET ORAL at 09:44

## 2022-01-17 ASSESSMENT — ACTIVITIES OF DAILY LIVING (ADL)
ADLS_ACUITY_SCORE: 4
ADLS_ACUITY_SCORE: 4
ADLS_ACUITY_SCORE: 6
ADLS_ACUITY_SCORE: 4
ADLS_ACUITY_SCORE: 6
ADLS_ACUITY_SCORE: 4
ADLS_ACUITY_SCORE: 4
ADLS_ACUITY_SCORE: 6
ADLS_ACUITY_SCORE: 4
ADLS_ACUITY_SCORE: 4
ADLS_ACUITY_SCORE: 6
ADLS_ACUITY_SCORE: 6
ADLS_ACUITY_SCORE: 4
ADLS_ACUITY_SCORE: 6
ADLS_ACUITY_SCORE: 4
ADLS_ACUITY_SCORE: 4

## 2022-01-17 NOTE — PLAN OF CARE
Problem: Gas Exchange Impaired  Goal: Optimal Gas Exchange  Outcome: Improving     Patient LS clear. Titrated patient from 5 liters down to room air today and sats remaining above 90% at rest. Does occasionally drop to 88% with activity but recovers right away. Denies pain. Call light within reach and able to make needs known.

## 2022-01-17 NOTE — PROGRESS NOTES
Spiritual Health Note    Spiritual Assessment:     visited patient due to patient request upon admission screening. Patient shared about medical condition and history, as it relates to her covid diagnosis. She stated that it was a lot worse than the previous time she had it. She expects to discharge home today.     Patient comes from [employer] [place] background and derives meaning, purpose, and comfort from teresa. She welcomes prayer.     Care Provided:    Introduced self and role of Spiritual Care     Empathic listening and presence     Helped patient in processing of emotions     Plan of Care: No further plans to visit at this time, but  staff available if further needs arise.       apoorva Stapleton MDiv, [facility]

## 2022-01-17 NOTE — PLAN OF CARE
Occupational Therapy Discharge Summary    Reason for therapy discharge:    Discharged to home with home therapy.    Progress towards therapy goal(s). See goals on Care Plan in [facility] electronic health record for goal details.  Goals partially met.  Barriers to achieving goals:   discharge from facility.    Therapy recommendation(s):    Continued therapy is recommended.  Rationale/Recommendations:  Pt is returning home w/Home Care services and family support..

## 2022-01-17 NOTE — PLAN OF CARE
Problem: OT General Care Plan  Goal: Transfer (OT)  Description: Transfer (OT)  Outcome: Improving     Problem: Risk for Delirium  Goal: Improved Sleep  Outcome: Improving     Patient comfortable in bed all night. Up to the bathroom independently.   Covid positive. O2 sats at 92% on room air all night. Some desaturation with movement.   Patient is hopeful to discharge today.   No pain reported. Children flew into the state to take care of her upon discharge.

## 2022-01-17 NOTE — PROGRESS NOTES
Patient contacted via phone regarding PMD required for home care to be provided. States her daughter is looking for a PMD for her. Patient verbalizes understanding that she needs to choose PMD, schedule an appointment with PMD and request home care at that appointment. Dr Brown aware. Patient agreeable with plan.

## 2022-01-17 NOTE — PLAN OF CARE
Problem: Adult Inpatient Plan of Care  Goal: Optimal Comfort and Wellbeing  Outcome: Improving    Pt denies pain or discomfort. Will continue to monitor.     Problem: Adult Inpatient Plan of Care  Goal: Readiness for Transition of Care  Outcome: Improving    Pt maintaining O2 sats >90% while on room air. Denies chest pain or SOB. VSS, will continue to monitor.

## 2022-01-18 ENCOUNTER — PATIENT OUTREACH (OUTPATIENT)
Dept: CARE COORDINATION | Facility: CLINIC | Age: 77
End: 2022-01-18
Payer: COMMERCIAL

## 2022-01-18 DIAGNOSIS — Z71.89 OTHER SPECIFIED COUNSELING: ICD-10-CM

## 2022-01-18 NOTE — PLAN OF CARE
Physical Therapy Discharge Summary    Reason for therapy discharge:    Discharged to home with home therapy.    Progress towards therapy goal(s). See goals on Care Plan in [facility] electronic health record for goal details.  Goals not met.  Barriers to achieving goals:   discharge from facility.    Therapy recommendation(s):    No further therapy is recommended.

## 2022-01-18 NOTE — PROGRESS NOTES
"Clinic Care Coordination Contact  [facility]: Post-Discharge Note  SITUATION                                                      Admission:    Admission Date: 01/11/22   Reason for Admission: Pneumonia due to 2019 novel coronavirus  Discharge:   Discharge Date: 01/17/22  Discharge Diagnosis: Pneumonia due to 2019 novel coronavirus    BACKGROUND                                                      Paulina Mendez is a 76 year old old female diagnosed COVID twice in the past not vaccinated presenting with symptoms of breathing difficulty a few days duration.  Also fatigue but no chest pain.  Denies abdominal pain, no diarrhea, no fever no headaches or urinary symptoms currently.  Work-up showed COVID-pneumonia with hypoxia.  She is admitted for the work-up    ASSESSMENT      Enrollment  Primary Care Care Coordination Status: Not a Candidate    Discharge Assessment  How are you doing now that you are home?: \" I'm doing fantastic\"  How are your symptoms? (Red Flag symptoms escalate to triage hotline per guidelines): Improved  Do you feel your condition is stable enough to be safe at home until your provider visit?: Yes  Does the patient have their discharge instructions? : Yes  Does the patient have questions regarding their discharge instructions? : No  Were you started on any new medications or were there changes to any of your previous medications? : Yes (but finished them)  Does the patient have all of their medications?: Yes  Do you have questions regarding any of your medications? : No  Do you have all of your needed medical supplies or equipment (DME)?  (i.e. oxygen tank, CPAP, cane, etc.): Yes  Discharge follow-up appointment scheduled within 14 calendar days? : Yes  Discharge Follow Up Appointment Date: 01/20/22  Discharge Follow Up Appointment Scheduled with?: Primary Care Provider    Post-op ([facility] CTA Only)  If the patient had a surgery or procedure, do they have any questions for a nurse?: " No             PLAN                                                      Outpatient Plan: Your activity upon discharge: activity as tolerated  Follow this diet upon discharge: Orders Placed This Encounter  Regular Diet Adult  Follow-up and recommended labs and tests  Please set up a PCP then you will be able to have the home health nurse  and physical therapy set up.     Future Appointments   Date Time Provider Department Center   1/20/2022  8:20 AM [facility]         For any urgent concerns, please contact our 24 hour nurse triage line: [phone] ([phone])         Arleen Ko MA

## 2022-01-20 ENCOUNTER — OFFICE VISIT (OUTPATIENT)
Dept: FAMILY MEDICINE | Facility: CLINIC | Age: 77
End: 2022-01-20
Payer: COMMERCIAL

## 2022-01-20 VITALS
RESPIRATION RATE: 20 BRPM | BODY MASS INDEX: 25.84 KG/M2 | OXYGEN SATURATION: 95 % | HEART RATE: 91 BPM | HEIGHT: 66 IN | WEIGHT: 160.8 LBS | SYSTOLIC BLOOD PRESSURE: 131 MMHG | DIASTOLIC BLOOD PRESSURE: 75 MMHG | TEMPERATURE: 98 F

## 2022-01-20 DIAGNOSIS — U07.1 PNEUMONIA DUE TO 2019 NOVEL CORONAVIRUS: ICD-10-CM

## 2022-01-20 DIAGNOSIS — R73.9 ELEVATED BLOOD SUGAR: ICD-10-CM

## 2022-01-20 DIAGNOSIS — R74.01 TRANSAMINITIS: ICD-10-CM

## 2022-01-20 DIAGNOSIS — J96.01 ACUTE RESPIRATORY FAILURE WITH HYPOXIA (H): ICD-10-CM

## 2022-01-20 DIAGNOSIS — J12.82 PNEUMONIA DUE TO 2019 NOVEL CORONAVIRUS: ICD-10-CM

## 2022-01-20 DIAGNOSIS — Z09 HOSPITAL DISCHARGE FOLLOW-UP: Primary | ICD-10-CM

## 2022-01-20 PROCEDURE — 99203 OFFICE O/P NEW LOW 30 MIN: CPT | Performed by: FAMILY MEDICINE

## 2022-01-20 ASSESSMENT — MIFFLIN-ST. JEOR: SCORE: 1241.13

## 2022-01-20 NOTE — PROGRESS NOTES
Assessment/ Plan     1. Hospital discharge follow-up  Paulina presents for hospital follow-up and establish care.  She has not had a primary care provider for over 8 years.  She was hospitalized at [facility] from 1/11 through 1/17 for acute respiratory failure with hypoxia related to pneumonia from COVID-19 infection.  She was admitted and placed on oxygen along with steroids.  She was not a candidate for remdesivir due to elevated LFTs.  She improved over the course of the week and was able to be discharged without home oxygen.  She feels like she is slowly improving at home.  She still has quite a bit of fatigue and some dyspnea with exertion.  She is really not checking her O2 sats at home for is not to do so.  We are due to recheck liver enzymes and glucose, however I think it is a little early to recheck these.  Would have her follow-up in 1 month.  At that time we will recheck her labs including LFTs and blood sugar.  Can update her tetanus booster at that time as well.  We discussed other all other preventative cares that she has not had in the last 10 years or so such as mammogram, colonoscopy, bone density, Pneumovax, etc. and she declines all of these.  We can be discussed when we see her in follow-up.    2. Acute respiratory failure with hypoxia (H)  Improved and now resolved.    3. Pneumonia due to 2019 novel coronavirus  Improving, last day of steroids today.    4. Transaminitis  Will recheck in 1 month    5. Elevated blood sugar  Recheck in 1 month.      Subjective:      Paulina Mendez is a 76 year old female who presents for hospital follow-up.  She presents with one of her daughters today, Zulma.  Patient is new to me today.  She states she has not had a primary care physician now for at least 8 years.  She was admitted to [facility] for hypoxia related to pneumonia from a COVID-19 infection.  She had elevated LFTs so was not a candidate for remdesivir.  She mainly had steroids and  "oxygen support.  She does live alone and feels like she has been able to manage since being home.  She does take some time and rest frequently.  She has 3 daughters but they all live [place] of state.  She states she has no underlying medical conditions, but has not seen a physician in quite some time.  She was a never smoker and does not have a history of asthma or wheezing.  She has not had any preventative cares in the last 10 years and declines today.  The only thing she be interested in would be a tetanus booster at follow-up.  Would not do today as she has been on steroids.  She has not had recurrence of fevers or chest pain.  She is not on any blood thinners, but does not think she needs them as she is getting up and being active every day.    Relevant past medical, family, surgical, and social history reviewed with patient, unless noted in HPI, not pertinent for this visit.  Medications were discussed and reconciled.   Review of Systems   A 12 point comprehensive review of systems was negative except as noted.      Current Outpatient Medications   Medication Sig Dispense Refill     aspirin (ASA) 81 MG EC tablet Take 1 tablet (81 mg) by mouth daily 30 tablet 0     cholecalciferol 25 MCG (1000 UT) TABS Take 1 tablet by mouth daily       multivitamin w/minerals (MULTI-VITAMIN) tablet Take 1 tablet by mouth daily       vitamin C (ASCORBIC ACID) 500 MG tablet Take 500 mg by mouth daily       zinc gluconate 50 MG tablet Take 50 mg by mouth daily           Objective:     /75   Pulse 91   Temp 98  F (36.7  C)   Resp 20   Ht 1.676 m (5' 6\")   Wt 72.9 kg (160 lb 12.8 oz)   LMP  (LMP Unknown)   SpO2 95%   BMI 25.95 kg/m      Body mass index is 25.95 kg/m .       General appearance: alert, appears stated age and cooperative  She is slightly dyspneic with talking and with movement.  Head: Normocephalic, without obvious abnormality, atraumatic  Eyes: conjunctivae/corneas clear.   Ears: normal TM's and external " ear canals both ears  Nose: Nares normal. Septum midline. Mucosa normal. No drainage or sinus tenderness.  Throat: lips, mucosa, and tongue normal; teeth and gums normal  Neck: no adenopathy  Lungs: clear to auscultation bilaterally  Heart: regular rate and rhythm, S1, S2 normal, no murmur, click, rub or gallop           Recent Results (from the past 168 hour(s))   Glucose by meter   Result Value Ref Range    GLUCOSE BY METER POCT 148 (H) 70 - 99 mg/dL   Glucose by meter   Result Value Ref Range    GLUCOSE BY METER POCT 183 (H) 70 - 99 mg/dL   Glucose by meter   Result Value Ref Range    GLUCOSE BY METER POCT 211 (H) 70 - 99 mg/dL   CBC with platelets   Result Value Ref Range    WBC Count 11.3 (H) 4.0 - 11.0 10e3/uL    RBC Count 4.39 3.80 - 5.20 10e6/uL    Hemoglobin 12.3 11.7 - 15.7 g/dL    Hematocrit 37.3 35.0 - 47.0 %    MCV 85 78 - 100 fL    MCH 28.0 26.5 - 33.0 pg    MCHC 33.0 31.5 - 36.5 g/dL    RDW 13.6 10.0 - 15.0 %    Platelet Count 615 (H) 150 - 450 10e3/uL   Basic metabolic panel   Result Value Ref Range    Sodium 139 136 - 145 mmol/L    Potassium 4.6 3.5 - 5.0 mmol/L    Chloride 106 98 - 107 mmol/L    Carbon Dioxide (CO2) 24 22 - 31 mmol/L    Anion Gap 9 5 - 18 mmol/L    Urea Nitrogen 20 8 - 28 mg/dL    Creatinine 0.62 0.60 - 1.10 mg/dL    Calcium 9.0 8.5 - 10.5 mg/dL    Glucose 102 70 - 125 mg/dL    GFR Estimate >90 >60 mL/min/1.73m2   Fibrinogen activity   Result Value Ref Range    Fibrinogen Activity 699 (H) 170 - 490 mg/dL   CRP inflammation   Result Value Ref Range    CRP 3.1 (H) 0.0-<0.8 mg/dL   D dimer quantitative   Result Value Ref Range    D-Dimer Quantitative 3.06 (H) 0.00 - 0.50 ug/mL FEU   INR   Result Value Ref Range    INR 1.02 0.90 - 1.15   Lactate Dehydrogenase   Result Value Ref Range    Lactate Dehydrogenase 466 (H) 125 - 220 U/L   Glucose by meter   Result Value Ref Range    GLUCOSE BY METER POCT 106 (H) 70 - 99 mg/dL   Glucose by meter   Result Value Ref Range    GLUCOSE BY METER  POCT 117 (H) 70 - 99 mg/dL   Glucose by meter   Result Value Ref Range    GLUCOSE BY METER POCT 274 (H) 70 - 99 mg/dL   Glucose by meter   Result Value Ref Range    GLUCOSE BY METER POCT 167 (H) 70 - 99 mg/dL   CBC with platelets   Result Value Ref Range    WBC Count 10.1 4.0 - 11.0 10e3/uL    RBC Count 4.50 3.80 - 5.20 10e6/uL    Hemoglobin 12.4 11.7 - 15.7 g/dL    Hematocrit 38.6 35.0 - 47.0 %    MCV 86 78 - 100 fL    MCH 27.6 26.5 - 33.0 pg    MCHC 32.1 31.5 - 36.5 g/dL    RDW 13.4 10.0 - 15.0 %    Platelet Count 629 (H) 150 - 450 10e3/uL   Basic metabolic panel   Result Value Ref Range    Sodium 139 136 - 145 mmol/L    Potassium 5.2 (H) 3.5 - 5.0 mmol/L    Chloride 106 98 - 107 mmol/L    Carbon Dioxide (CO2) 22 22 - 31 mmol/L    Anion Gap 11 5 - 18 mmol/L    Urea Nitrogen 19 8 - 28 mg/dL    Creatinine 0.61 0.60 - 1.10 mg/dL    Calcium 8.2 (L) 8.5 - 10.5 mg/dL    Glucose 80 70 - 125 mg/dL    GFR Estimate >90 >60 mL/min/1.73m2   Fibrinogen activity   Result Value Ref Range    Fibrinogen Activity 654 (H) 170 - 490 mg/dL   CRP inflammation   Result Value Ref Range    CRP 1.9 (H) 0.0-<0.8 mg/dL   D dimer quantitative   Result Value Ref Range    D-Dimer Quantitative 3.13 (H) 0.00 - 0.50 ug/mL FEU   INR   Result Value Ref Range    INR 1.05 0.90 - 1.15   Lactate Dehydrogenase   Result Value Ref Range    Lactate Dehydrogenase 475 (H) 125 - 220 U/L   Glucose by meter   Result Value Ref Range    GLUCOSE BY METER POCT 103 (H) 70 - 99 mg/dL   Glucose by meter   Result Value Ref Range    GLUCOSE BY METER POCT 175 (H) 70 - 99 mg/dL   Glucose by meter   Result Value Ref Range    GLUCOSE BY METER POCT 200 (H) 70 - 99 mg/dL   Glucose by meter   Result Value Ref Range    GLUCOSE BY METER POCT 152 (H) 70 - 99 mg/dL   CBC with platelets   Result Value Ref Range    WBC Count 10.6 4.0 - 11.0 10e3/uL    RBC Count 4.55 3.80 - 5.20 10e6/uL    Hemoglobin 12.6 11.7 - 15.7 g/dL    Hematocrit 38.9 35.0 - 47.0 %    MCV 86 78 - 100 fL    MCH  27.7 26.5 - 33.0 pg    MCHC 32.4 31.5 - 36.5 g/dL    RDW 13.3 10.0 - 15.0 %    Platelet Count 677 (H) 150 - 450 10e3/uL   Basic metabolic panel   Result Value Ref Range    Sodium 138 136 - 145 mmol/L    Potassium 4.4 3.5 - 5.0 mmol/L    Chloride 104 98 - 107 mmol/L    Carbon Dioxide (CO2) 24 22 - 31 mmol/L    Anion Gap 10 5 - 18 mmol/L    Urea Nitrogen 19 8 - 28 mg/dL    Creatinine 0.61 0.60 - 1.10 mg/dL    Calcium 9.1 8.5 - 10.5 mg/dL    Glucose 92 70 - 125 mg/dL    GFR Estimate >90 >60 mL/min/1.73m2   Hepatic panel   Result Value Ref Range    Bilirubin Total 0.5 0.0 - 1.0 mg/dL    Bilirubin Direct 0.2 <=0.5 mg/dL    Protein Total 6.5 6.0 - 8.0 g/dL    Albumin 2.5 (L) 3.5 - 5.0 g/dL    Alkaline Phosphatase 178 (H) 45 - 120 U/L    AST 49 (H) 0 - 40 U/L     (H) 0 - 45 U/L   Glucose by meter   Result Value Ref Range    GLUCOSE BY METER POCT 98 70 - 99 mg/dL   Glucose by meter   Result Value Ref Range    GLUCOSE BY METER POCT 133 (H) 70 - 99 mg/dL   Glucose by meter   Result Value Ref Range    GLUCOSE BY METER POCT 258 (H) 70 - 99 mg/dL   Glucose by meter   Result Value Ref Range    GLUCOSE BY METER POCT 167 (H) 70 - 99 mg/dL   CBC with platelets   Result Value Ref Range    WBC Count 10.5 4.0 - 11.0 10e3/uL    RBC Count 4.91 3.80 - 5.20 10e6/uL    Hemoglobin 13.2 11.7 - 15.7 g/dL    Hematocrit 41.8 35.0 - 47.0 %    MCV 85 78 - 100 fL    MCH 26.9 26.5 - 33.0 pg    MCHC 31.6 31.5 - 36.5 g/dL    RDW 13.5 10.0 - 15.0 %    Platelet Count 769 (H) 150 - 450 10e3/uL   Basic metabolic panel   Result Value Ref Range    Sodium 139 136 - 145 mmol/L    Potassium 4.5 3.5 - 5.0 mmol/L    Chloride 104 98 - 107 mmol/L    Carbon Dioxide (CO2) 22 22 - 31 mmol/L    Anion Gap 13 5 - 18 mmol/L    Urea Nitrogen 24 8 - 28 mg/dL    Creatinine 0.63 0.60 - 1.10 mg/dL    Calcium 9.2 8.5 - 10.5 mg/dL    Glucose 89 70 - 125 mg/dL    GFR Estimate >90 >60 mL/min/1.73m2   Glucose by meter   Result Value Ref Range    GLUCOSE BY METER POCT 94 70  - 99 mg/dL   Glucose by meter   Result Value Ref Range    GLUCOSE BY METER POCT 120 (H) 70 - 99 mg/dL          This note has been dictated using voice recognition software. Any grammatical or context distortions are unintentional and inherent to the software

## 2022-01-21 NOTE — DISCHARGE SUMMARY
[facility]  Hospitalist Discharge Summary      Date of Admission:  1/11/2022  Date of Discharge:  1/17/2022  3:26 PM  Discharging Provider: Itzel Brown MD  Discharge Service: Hospitalist Service    Discharge Diagnoses   COVID-19 pneumonia with acute hypoxic respiratory failure  Transaminitis secondary to COVID-19  Steroid-induced hyperglycemia  Simple cystitis    Follow-ups Needed After Discharge   Follow-up Appointments     Follow-up and recommended labs and tests       Please set up a PCP then you will be able to have the home health nurse   and physical therapy set up.             Unresulted Labs Ordered in the Past 30 Days of this Admission     No orders found from 12/12/2021 to 1/12/2022.      These results will be followed up by Itzel Brown    Discharge Disposition   Discharged to home  Condition at discharge: Good      Hospital Course      76-year-old patient with past medical history of hyperlipidemia, anxiety, unvaccinated against COVID-19, presented with shortness of breath found to have COVID-19 infection     COVID-19 infection,  Acute respiratory failure with hypoxia secondary to COVID-19  Viral pneumonia secondary to COVID-19  -Symptom onset: December 27  - Date of first test positive: 1/6/2022  - Vaccination status: declined vaccine  - Patient is on COVID-19 precautions, no longer requiring supplemental O2  - Dexamethasone 6 mg for 10 days started on January 11  -Remdesivir declined  -She needs a PCP for follow-up outpatient, they will have to write a prescription for physical therapy     Transaminitis  - Likely secondary to viral illness, will monitor     DVT prophylaxis  - Patient is on Lovenox 40 mg daily consider anticoagulation for 30 days or until return to normal mobility upon discharge     Steroid-induced hyperglycemia  - On insulin sliding scale with meals,  - NPH 15 units in the morning at time of dexamethasone     E. coli UTI   - Keflex for treatment of  UTI for a total of 7 days     Hyperlipidemia  - Holding statin due to abnormal liver function test    Consultations This Hospital Stay   SOCIAL WORK IP CONSULT  PHYSICAL THERAPY ADULT IP CONSULT  OCCUPATIONAL THERAPY ADULT IP CONSULT    Code Status   Prior    Time Spent on this Encounter   I, Itzel Brown MD, personally saw the patient today and spent greater than 30 minutes discharging this patient.       Itzel Brown MD  [address] [address]  Phone: [phone]  Fax: [phone]  ______________________________________________________________________    Physical Exam   Vital Signs:                    Weight: 158 lbs 4.8 oz  General Appearance: Awake, alert, in no acute distress  Respiratory: CTAB, no wheeze  Cardiovascular: RRR, no murmur noted  GI: soft, nontender, non distended, normal bowel sounds  Skin: no jaundice, no rash         Primary Care Physician   Physician No Ref-Primary    Discharge Orders      Reason for your hospital stay    COVID-19 infection with acute hypoxic respiratory failure  Urinary tract infection     Follow-up and recommended labs and tests     Please set up a PCP then you will be able to have the home health nurse and physical therapy set up.     Activity    Your activity upon discharge: activity as tolerated     Diet    Follow this diet upon discharge: Orders Placed This Encounter      Regular Diet Adult       Significant Results and Procedures   Most Recent 3 CBC's:Recent Labs   Lab Test 01/17/22  0834 01/16/22  0709 01/15/22  0746   WBC 10.5 10.6 10.1   HGB 13.2 12.6 12.4   MCV 85 86 86   * 677* 629*     Most Recent 3 BMP's:Recent Labs   Lab Test 01/17/22  1249 01/17/22  0939 01/17/22  0834 01/16/22  0826 01/16/22  0709 01/15/22  0913 01/15/22  0746   NA  --   --  139  --  138  --  139   POTASSIUM  --   --  4.5  --  4.4  --  5.2*   CHLORIDE  --   --  104  --  104  --  106   CO2  --   --  22  --  24  --   22   BUN  --   --  24  --  19  --  19   CR  --   --  0.63  --  0.61  --  0.61   ANIONGAP  --   --  13  --  10  --  11   ASHLI  --   --  9.2  --  9.1  --  8.2*   * 94 89   < > 92   < > 80    < > = values in this interval not displayed.     Most Recent 2 LFT's:Recent Labs   Lab Test 01/16/22  0709 01/13/22  0703   AST 49* 124*   * 194*   ALKPHOS 178* 202*   BILITOTAL 0.5 0.6     Most Recent INR's and Anticoagulation Dosing History:  Anticoagulation Dose History     Recent Dosing and Labs Latest Ref Rng & Units 1/12/2022 1/13/2022 1/14/2022 1/15/2022    INR 0.90 - 1.15 1.16(H) 1.10 1.02 1.05      ,   Results for orders placed or performed during the hospital encounter of 01/11/22   CT Chest Pulmonary Embolism w Contrast    Narrative    EXAM: CT CHEST PULMONARY EMBOLISM W CONTRAST  LOCATION: [facility]  DATE/TIME: 1/11/2022 1:14 PM    INDICATION: COVID pneumonia, hypoxia  COMPARISON: None.  TECHNIQUE: CT chest pulmonary angiogram during arterial phase injection of IV contrast. Multiplanar reformats and MIP reconstructions were performed. Dose reduction techniques were used.   CONTRAST: Isovue 370 100ml    FINDINGS:  ANGIOGRAM CHEST: Pulmonary arteries are normal caliber and negative for pulmonary emboli. No thoracic aortic aneurysm or findings to suggest acute aortic syndrome. A few small foci of atheromatous calcified plaque are present in the descending thoracic   aorta.    LUNGS AND PLEURA: Extensive bilateral mixed alveolar and interstitial opacities are present. The alveolar component is a mixture of groundglass and consolidative opacity. Within the territories of groundglass there is intralobular septal thickening   (crazy paving). Trachea and central airways are patent. No pleural effusion.    MEDIASTINUM: cardiac chambers are normal in size. No pericardial effusion. Conventional arch anatomy. Tortuosity of the imaged proximal great vessels. Small hiatal hernia. Esophagus is  decompressed. Upper limit of normal-sized hilar, subcarinal, and   right lower paratracheal lymph nodes, likely reactive in the setting of airspace disease.    CORONARY ARTERY CALCIFICATION: None.    UPPER ABDOMEN: Normal.    MUSCULOSKELETAL: Multilevel mild to moderate degenerative disc space narrowing and small marginal osteophytes of thoracic vertebra. No aggressive or destructive bone lesions are present. Mature bony callus around old fracture deformity of the right   lateral sixth rib.      Impression    IMPRESSION:    1.  No acute pulmonary embolism or aortopathy.  2.  Commonly reported imaging features of (COVID-19) pneumonia are present.   3.  Minimal hiatus hernia.       Discharge Medications   Discharge Medication List as of 1/17/2022  2:31 PM      START taking these medications    Details   dexamethasone (DECADRON) 6 MG tablet Take 1 tablet (6 mg) by mouth daily for 3 days, Disp-3 tablet, R-0, E-Prescribe         CONTINUE these medications which have CHANGED    Details   aspirin (ASA) 81 MG EC tablet Take 1 tablet (81 mg) by mouth daily, Disp-30 tablet, R-0, E-Prescribe      cephALEXin (KEFLEX) 500 MG capsule Take 1 capsule (500 mg) by mouth 2 times daily for 2 doses, Disp-2 capsule, R-0, E-Prescribe         CONTINUE these medications which have NOT CHANGED    Details   cholecalciferol 25 MCG (1000 UT) TABS Take 1 tablet by mouth daily, Historical      multivitamin w/minerals (MULTI-VITAMIN) tablet Take 1 tablet by mouth daily, Historical      vitamin C (ASCORBIC ACID) 500 MG tablet Take 500 mg by mouth daily, Historical      zinc gluconate 50 MG tablet Take 50 mg by mouth daily, Historical         STOP taking these medications       acetaminophen (TYLENOL) 325 MG tablet Comments:   Reason for Stopping:         ondansetron (ZOFRAN-ODT) 4 MG ODT tab Comments:   Reason for Stopping:         potassium chloride ER (KLOR-CON M) 20 MEQ CR tablet Comments:   Reason for Stopping:             Allergies   No Known  Allergies

## 2022-02-17 ENCOUNTER — OFFICE VISIT (OUTPATIENT)
Dept: FAMILY MEDICINE | Facility: CLINIC | Age: 77
End: 2022-02-17
Payer: COMMERCIAL

## 2022-02-17 VITALS
SYSTOLIC BLOOD PRESSURE: 139 MMHG | DIASTOLIC BLOOD PRESSURE: 76 MMHG | OXYGEN SATURATION: 96 % | BODY MASS INDEX: 28.76 KG/M2 | TEMPERATURE: 98 F | HEART RATE: 89 BPM | WEIGHT: 172.6 LBS | RESPIRATION RATE: 20 BRPM | HEIGHT: 65 IN

## 2022-02-17 DIAGNOSIS — R74.01 TRANSAMINITIS: ICD-10-CM

## 2022-02-17 DIAGNOSIS — U07.1 PNEUMONIA DUE TO 2019 NOVEL CORONAVIRUS: Primary | ICD-10-CM

## 2022-02-17 DIAGNOSIS — R73.9 ELEVATED BLOOD SUGAR: ICD-10-CM

## 2022-02-17 DIAGNOSIS — J12.82 PNEUMONIA DUE TO 2019 NOVEL CORONAVIRUS: Primary | ICD-10-CM

## 2022-02-17 LAB
ALBUMIN SERPL-MCNC: 3.5 G/DL (ref 3.5–5)
ALP SERPL-CCNC: 142 U/L (ref 45–120)
ALT SERPL W P-5'-P-CCNC: 24 U/L (ref 0–45)
ANION GAP SERPL CALCULATED.3IONS-SCNC: 11 MMOL/L (ref 5–18)
AST SERPL W P-5'-P-CCNC: 21 U/L (ref 0–40)
BILIRUB SERPL-MCNC: 0.4 MG/DL (ref 0–1)
BUN SERPL-MCNC: 17 MG/DL (ref 8–28)
CALCIUM SERPL-MCNC: 9.7 MG/DL (ref 8.5–10.5)
CHLORIDE BLD-SCNC: 104 MMOL/L (ref 98–107)
CO2 SERPL-SCNC: 23 MMOL/L (ref 22–31)
CREAT SERPL-MCNC: 0.74 MG/DL (ref 0.6–1.1)
GFR SERPL CREATININE-BSD FRML MDRD: 84 ML/MIN/1.73M2
GLUCOSE BLD-MCNC: 94 MG/DL (ref 70–125)
HBA1C MFR BLD: 5.9 %
POTASSIUM BLD-SCNC: 4.3 MMOL/L (ref 3.5–5)
PROT SERPL-MCNC: 7.1 G/DL (ref 6–8)
SODIUM SERPL-SCNC: 138 MMOL/L (ref 136–145)

## 2022-02-17 PROCEDURE — 99214 OFFICE O/P EST MOD 30 MIN: CPT | Performed by: FAMILY MEDICINE

## 2022-02-17 PROCEDURE — 83036 HEMOGLOBIN GLYCOSYLATED A1C: CPT | Performed by: FAMILY MEDICINE

## 2022-02-17 PROCEDURE — 80053 COMPREHEN METABOLIC PANEL: CPT | Performed by: FAMILY MEDICINE

## 2022-02-17 PROCEDURE — 36415 COLL VENOUS BLD VENIPUNCTURE: CPT | Performed by: FAMILY MEDICINE

## 2022-02-17 RX ORDER — ASPIRIN 81 MG/1
81 TABLET ORAL DAILY
COMMUNITY
End: 2023-10-02

## 2022-02-17 NOTE — LETTER
February 21, 2022      Paulina REMY Mendez  [address] [address]        Dear ,    We are writing to inform you of your test results.    Your A1C is in the prediabetes range.  Your liver tests are improved.  Your chest xray still shows some changes due to covid but no new infiltrates.    Resulted Orders   Comprehensive metabolic panel   Result Value Ref Range    Sodium 138 136 - 145 mmol/L    Potassium 4.3 3.5 - 5.0 mmol/L    Chloride 104 98 - 107 mmol/L    Carbon Dioxide (CO2) 23 22 - 31 mmol/L    Anion Gap 11 5 - 18 mmol/L    Urea Nitrogen 17 8 - 28 mg/dL    Creatinine 0.74 0.60 - 1.10 mg/dL    Calcium 9.7 8.5 - 10.5 mg/dL    Glucose 94 70 - 125 mg/dL    Alkaline Phosphatase 142 (H) 45 - 120 U/L    AST 21 0 - 40 U/L    ALT 24 0 - 45 U/L    Protein Total 7.1 6.0 - 8.0 g/dL    Albumin 3.5 3.5 - 5.0 g/dL    Bilirubin Total 0.4 0.0 - 1.0 mg/dL    GFR Estimate 84 >60 mL/min/1.73m2      Comment:      Effective December 21, 2021 eGFRcr in adults is calculated using the 2021 CKD-EPI creatinine equation which includes age and gender (Michael et al., NEJM, DOI: 10.1056/TVOKot9676940)   Hemoglobin A1c   Result Value Ref Range    Hemoglobin A1C 5.9 (H) <=5.6 %      Comment:        Prediabetes: 5.7 to 6.4%        Diabetes:  >=6.5%     Patients with Hgb F >5%, total bilirubin >10.0 mg/dL, abnormal red cell turnover, severe renal or hepatic disease or malignancy should not have this A1C method used to diagnose or monitor diabetes.        If you have any questions or concerns, please call the clinic at the number listed above.       Sincerely,      Jessica Panda

## 2022-02-17 NOTE — PROGRESS NOTES
SUBJECTIVE:   Paulina Mendez is a 76 year old female who presents for Preventive Visit.    Patient was due for an annual wellness today, but declined doing the mini cog and any preventative cares.  Therefore, this will be discharge as an office visit.  She is here following up on a hospitalization that she had for Covid.  I saw her initially after she was out of the hospital, but it was too soon to recheck her liver tests and her blood sugar as she was still taking steroids.  She is following up today to recheck those blood levels now that its been about 6 weeks.  She states she still is having some shortness of breath and some weakness, but overall it is improving.  She does have a bit of a dry cough.    She has not seen a physician in many years.  She declines all preventative cares despite discussion including mammogram, bone density, colonoscopy, flu shot, COVID vaccine, and Pneumovax.    Of note, patient brought up on multiple occasions that she felt she was mistreated for her Covid in the emergency room as she was not sent home on dexamethasone.  I discussed there are protocols in place that follow evidence-based medicine and follow-up further conversation was likely not of any benefit.        Are you in the first 12 months of your Medicare coverage?  No    Healthy Habits:     Taking medications regularly:  0    PHQ-2 Total Score: 0  History of Present Illness     Reason for visit:  Month check up after hospital with covid  Symptom onset:  More than a month  Symptoms include:  Sob loss of strength  Symptom intensity:  Moderate  Symptom progression:  Improving  Had these symptoms before:  No  What makes it worse:  Masks  What makes it better:  Cold    She eats 2-3 servings of fruits and vegetables daily.She consumes 1 sweetened beverage(s) daily.She exercises with enough effort to increase her heart rate 60 or more minutes per day.  She exercises with enough effort to increase her heart rate 7 days per  "week.   She is taking medications regularly.    Do you feel safe in your environment? Yes    Have you ever done Advance Care Planning? (For example, a Health Directive, POLST, or a discussion with a medical provider or your loved ones about your wishes): Yes, advance care planning is on file.       Fall risk  Fallen 2 or more times in the past year?: No  Any fall with injury in the past year?: No    Cognitive Screening - Refuses test today.     Do you have sleep apnea, excessive snoring or daytime drowsiness?: no    Reviewed and updated as needed this visit by clinical staff   Tobacco  Allergies               Reviewed and updated as needed this visit by Provider                 Social History     Tobacco Use     Smoking status: Never Smoker     Smokeless tobacco: Never Used   Substance Use Topics     Alcohol use: Not Currently         No flowsheet data found.            Current providers sharing in care for this patient include:   Patient Care Team:  No Ref-Primary, Physician as PCP - Jessica Arellano as Assigned PCP    The following health maintenance items are reviewed in Epic and correct as of today:  Health Maintenance Due   Topic Date Due     DEXA  Never done     ANNUAL REVIEW OF HM ORDERS  Never done     ADVANCE CARE PLANNING  Never done     MAMMO SCREENING  Never done     COVID-19 Vaccine (1) Never done     COLORECTAL CANCER SCREENING  Never done     HEPATITIS C SCREENING  Never done     DTAP/TDAP/TD IMMUNIZATION (1 - Tdap) Never done     ZOSTER IMMUNIZATION (1 of 2) Never done     MEDICARE ANNUAL WELLNESS VISIT  Never done     FALL RISK ASSESSMENT  Never done     Pneumococcal Vaccine: 65+ Years (1 of 1 - PPSV23) Never done     LIPID  02/02/2015     INFLUENZA VACCINE (1) Never done     Lab work is in process      OBJECTIVE:   /76   Pulse 89   Resp 20   Ht 1.651 m (5' 5\")   Wt 78.3 kg (172 lb 9.6 oz)   LMP  (LMP Unknown)   SpO2 96%   BMI 28.72 kg/m   Estimated body mass index is 28.72 " "kg/m  as calculated from the following:    Height as of this encounter: 1.651 m (5' 5\").    Weight as of this encounter: 78.3 kg (172 lb 9.6 oz).  Physical Exam  GENERAL: healthy, alert and no distress  NECK: no adenopathy, no asymmetry, masses, or scars and thyroid normal to palpation  RESP: lungs clear to auscultation - no rales, rhonchi or wheezes  CV: regular rate and rhythm, normal S1 S2, no S3 or S4, no murmur, click or rub, no peripheral edema and peripheral pulses strong    Diagnostic Test Results:  Labs reviewed in [facility]  CXR -personally reviewed: Also reviewed by radiology which felt that there was moderate improvement to the pulmonary infiltrates with some mild remaining infiltrates.  There is no new findings.    ASSESSMENT / PLAN:   1. Pneumonia due to 2019 novel coronavirus  Patient returns approximately 6 weeks after being hospitalized for COVID-19 pneumonia.  She has been slowly clinically improving.  Her checks x-ray shows some improvement.  - XR Chest 2 Views; Future    2. Transaminitis  She had quite elevated LFTs when she was in the hospital.  We will recheck today.  - Comprehensive metabolic panel; Future  - Comprehensive metabolic panel    3. Elevated blood sugar  She was also having high blood sugars but had been on steroids.  We will recheck today.  - Hemoglobin A1c; Future  - Hemoglobin A1c        COUNSELING:  Patient declines all immunizations and preventative screenings.    Estimated body mass index is 28.72 kg/m  as calculated from the following:    Height as of this encounter: 1.651 m (5' 5\").    Weight as of this encounter: 78.3 kg (172 lb 9.6 oz).  She reports that she has never smoked. She has never used smokeless tobacco.      Appropriate preventive services were discussed with this patient, including applicable screening as appropriate for cardiovascular disease, diabetes, osteopenia/osteoporosis, and glaucoma.  As appropriate for age/gender, discussed screening for colorectal cancer, " prostate cancer, breast cancer, and cervical cancer. Checklist reviewing preventive services available has been given to the patient.        Counseling Resources:  ATP IV Guidelines  Pooled Cohorts Equation Calculator  Breast Cancer Risk Calculator  Breast Cancer: Medication to Reduce Risk  FRAX Risk Assessment  ICSI Preventive Guidelines  Dietary Guidelines for Americans, 2010  USDA's MyPlate  ASA Prophylaxis  Lung CA Screening    Jessica Panda  [facility]    Identified Health Risks:

## 2023-06-20 ENCOUNTER — TRANSFERRED RECORDS (OUTPATIENT)
Dept: HEALTH INFORMATION MANAGEMENT | Facility: CLINIC | Age: 78
End: 2023-06-20
Payer: COMMERCIAL

## 2023-09-20 ENCOUNTER — MEDICAL CORRESPONDENCE (OUTPATIENT)
Dept: HEALTH INFORMATION MANAGEMENT | Facility: CLINIC | Age: 78
End: 2023-09-20

## 2023-09-28 ENCOUNTER — MEDICAL CORRESPONDENCE (OUTPATIENT)
Dept: HEALTH INFORMATION MANAGEMENT | Facility: CLINIC | Age: 78
End: 2023-09-28

## 2023-09-28 ENCOUNTER — TRANSFERRED RECORDS (OUTPATIENT)
Dept: HEALTH INFORMATION MANAGEMENT | Facility: CLINIC | Age: 78
End: 2023-09-28
Payer: COMMERCIAL

## 2023-10-02 ENCOUNTER — OFFICE VISIT (OUTPATIENT)
Dept: FAMILY MEDICINE | Facility: CLINIC | Age: 78
End: 2023-10-02
Payer: COMMERCIAL

## 2023-10-02 VITALS
SYSTOLIC BLOOD PRESSURE: 132 MMHG | HEART RATE: 72 BPM | WEIGHT: 165.2 LBS | HEIGHT: 64 IN | OXYGEN SATURATION: 95 % | DIASTOLIC BLOOD PRESSURE: 74 MMHG | RESPIRATION RATE: 20 BRPM | TEMPERATURE: 97.4 F | BODY MASS INDEX: 28.2 KG/M2

## 2023-10-02 DIAGNOSIS — M16.11 PRIMARY OSTEOARTHRITIS OF RIGHT HIP: ICD-10-CM

## 2023-10-02 DIAGNOSIS — Z01.818 PREOP GENERAL PHYSICAL EXAM: Primary | ICD-10-CM

## 2023-10-02 LAB
BASO+EOS+MONOS # BLD AUTO: NORMAL 10*3/UL
BASO+EOS+MONOS NFR BLD AUTO: NORMAL %
BASOPHILS # BLD AUTO: 0.1 10E3/UL (ref 0–0.2)
BASOPHILS NFR BLD AUTO: 1 %
EOSINOPHIL # BLD AUTO: 0.1 10E3/UL (ref 0–0.7)
EOSINOPHIL NFR BLD AUTO: 2 %
ERYTHROCYTE [DISTWIDTH] IN BLOOD BY AUTOMATED COUNT: 14.1 % (ref 10–15)
HCT VFR BLD AUTO: 40.9 % (ref 35–47)
HGB BLD-MCNC: 13.4 G/DL (ref 11.7–15.7)
IMM GRANULOCYTES # BLD: 0 10E3/UL
IMM GRANULOCYTES NFR BLD: 0 %
LYMPHOCYTES # BLD AUTO: 2.5 10E3/UL (ref 0.8–5.3)
LYMPHOCYTES NFR BLD AUTO: 30 %
MCH RBC QN AUTO: 27.3 PG (ref 26.5–33)
MCHC RBC AUTO-ENTMCNC: 32.8 G/DL (ref 31.5–36.5)
MCV RBC AUTO: 83 FL (ref 78–100)
MONOCYTES # BLD AUTO: 0.5 10E3/UL (ref 0–1.3)
MONOCYTES NFR BLD AUTO: 6 %
NEUTROPHILS # BLD AUTO: 5.2 10E3/UL (ref 1.6–8.3)
NEUTROPHILS NFR BLD AUTO: 62 %
PLATELET # BLD AUTO: 362 10E3/UL (ref 150–450)
RBC # BLD AUTO: 4.91 10E6/UL (ref 3.8–5.2)
WBC # BLD AUTO: 8.4 10E3/UL (ref 4–11)

## 2023-10-02 PROCEDURE — 85025 COMPLETE CBC W/AUTO DIFF WBC: CPT | Performed by: PHYSICIAN ASSISTANT

## 2023-10-02 PROCEDURE — 93000 ELECTROCARDIOGRAM COMPLETE: CPT | Performed by: PHYSICIAN ASSISTANT

## 2023-10-02 PROCEDURE — 36415 COLL VENOUS BLD VENIPUNCTURE: CPT | Performed by: PHYSICIAN ASSISTANT

## 2023-10-02 PROCEDURE — 99214 OFFICE O/P EST MOD 30 MIN: CPT | Performed by: PHYSICIAN ASSISTANT

## 2023-10-02 PROCEDURE — 80048 BASIC METABOLIC PNL TOTAL CA: CPT | Performed by: PHYSICIAN ASSISTANT

## 2023-10-02 ASSESSMENT — PAIN SCALES - GENERAL: PAINLEVEL: MILD PAIN (2)

## 2023-10-02 NOTE — H&P (VIEW-ONLY)
[facility] [place]  [address]  [place] [address]  Phone: [phone]  Primary Provider: Jessica Panda  Pre-op Performing Provider: MILADIS MAO      PREOPERATIVE EVALUATION:  Today's date: 10/2/2023    Paulina is a 78 year old female who presents for a preoperative evaluation.      10/2/2023     2:04 PM   Additional Questions   Roomed by Jackelyn Spear CMA   Accompanied by Laura - Sister in law         10/2/2023     2:04 PM   Patient Reported Additional Medications   Patient reports taking the following new medications none       Surgical Information:  Surgery/Procedure: Right Hip Replacement  Surgery Location: [facility]  Surgeon: Dr. Bueno  Surgery Date: 10/27/23  Time of Surgery: To be determined  Where patient plans to recover: At home with family  Fax number for surgical facility: Note does not need to be faxed, will be available electronically in Epic.    Paulina was seen today for pre-op exam and health maintenance.    Diagnoses and all orders for this visit:    Preop general physical exam    Primary osteoarthritis of right hip  -     Basic metabolic panel  (Ca, Cl, CO2, Creat, Gluc, K, Na, BUN); Future  -     CBC with platelets and differential; Future  -     EKG 12-lead complete w/read - Clinics  -     CBC with platelets and differential  -     Basic metabolic panel  (Ca, Cl, CO2, Creat, Gluc, K, Na, BUN)    Other orders  -     REVIEW OF HEALTH MAINTENANCE PROTOCOL ORDERS        Paulina is cleared for surgery and appropriate anesthesia.  Subjective       HPI related to upcoming procedure: Right Hip Replacement        10/2/2023     1:52 PM   Preop Questions   1. Have you ever had a heart attack or stroke? No   2. Have you ever had surgery on your heart or blood vessels, such as a stent placement, a coronary artery bypass, or surgery on an artery in your head, neck, heart, or legs? No   3. Do you have chest pain with activity? No   4. Do you have a  history of  heart failure? No   5. Do you currently have a cold, bronchitis or symptoms of other infection? No   6. Do you have a cough, shortness of breath, or wheezing? No   7. Do you or anyone in your family have previous history of blood clots? No   8. Do you or does anyone in your family have a serious bleeding problem such as prolonged bleeding following surgeries or cuts? No   9. Have you ever had problems with anemia or been told to take iron pills? No   10. Have you had any abnormal blood loss such as black, tarry or bloody stools, or abnormal vaginal bleeding? No   11. Have you ever had a blood transfusion? No   12. Are you willing to have a blood transfusion if it is medically needed before, during, or after your surgery? Yes   13. Have you or any of your relatives ever had problems with anesthesia? No   14. Do you have sleep apnea, excessive snoring or daytime drowsiness? No   15. Do you have any artifical heart valves or other implanted medical devices like a pacemaker, defibrillator, or continuous glucose monitor? No   16. Do you have artificial joints? No   17. Are you allergic to latex? No       Health Care Directive:  Patient does not have a Health Care Directive or Living Will: Discussed advance care planning with patient; however, patient declined at this time.    Preoperative Review of :   reviewed - no record of controlled substances prescribed.      Status of Chronic Conditions:  See problem list for active medical problems.  Problems all longstanding and stable, except as noted/documented.  See ROS for pertinent symptoms related to these conditions.    Review of Systems  CONSTITUTIONAL: NEGATIVE for fever, chills, change in weight  INTEGUMENTARY/SKIN: NEGATIVE for worrisome rashes, moles or lesions  EYES: NEGATIVE for vision changes or irritation  ENT/MOUTH: NEGATIVE for ear, mouth and throat problems  RESP: NEGATIVE for significant cough or SOB  CV: NEGATIVE for chest pain, palpitations  "or peripheral edema  GI: NEGATIVE for nausea, abdominal pain, heartburn, or change in bowel habits  : NEGATIVE for frequency, dysuria, or hematuria  MUSCULOSKELETAL: NEGATIVE for significant arthralgias or myalgia  NEURO: NEGATIVE for weakness, dizziness or paresthesias  ENDOCRINE: NEGATIVE for temperature intolerance, skin/hair changes  HEME: NEGATIVE for bleeding problems  PSYCHIATRIC: NEGATIVE for changes in mood or affect    Patient Active Problem List    Diagnosis Date Noted    Transaminitis 01/11/2022     Priority: Medium    Pneumonia due to 2019 novel coronavirus 01/11/2022     Priority: Medium    Osteoporosis      Priority: Medium     Created by Conversion  Replacement Utility updated for latest IMO load        Anxiety      Priority: Medium     Created by Conversion  Replacement Utility updated for latest IMO load        Hyperlipidemia      Priority: Medium     Created by Conversion          No past medical history on file.  Past Surgical History:   Procedure Laterality Date    C UNLISTED PROCEDURE, ABDOMEN/PERITONEUM/OMENTUM      Description: Hernia Repair;  Recorded: 07/28/2008;    HC REMOVAL OF TONSILS,<13 Y/O      Description: Tonsillectomy;  Recorded: 07/28/2008;    ORTHOPEDIC SURGERY       Current Outpatient Medications   Medication Sig Dispense Refill    Glucosamine HCl (GLUCOSAMINE PO)       multivitamin w/minerals (MULTI-VITAMIN) tablet Take 1 tablet by mouth daily         No Known Allergies     Social History     Tobacco Use    Smoking status: Never    Smokeless tobacco: Never   Substance Use Topics    Alcohol use: Not Currently     No family history on file.  History   Drug Use Unknown         Objective     /74   Pulse 72   Temp 97.4  F (36.3  C) (Temporal)   Resp 20   Ht 1.619 m (5' 3.75\")   Wt 74.9 kg (165 lb 3.2 oz)   LMP  (LMP Unknown)   SpO2 95%   BMI 28.58 kg/m      Physical Exam    GENERAL APPEARANCE: healthy, alert and no distress     EYES: EOMI, PERRL     HENT: ear canals " and TM's normal and nose and mouth without ulcers or lesions     NECK: no adenopathy, no asymmetry, masses, or scars and thyroid normal to palpation     RESP: lungs clear to auscultation - no rales, rhonchi or wheezes     CV: regular rates and rhythm, normal S1 S2, no S3 or S4 and no murmur, click or rub     ABDOMEN:  soft, nontender, no HSM or masses and bowel sounds normal     MS: extremities normal- no gross deformities noted, no evidence of inflammation in joints, FROM in all extremities.     SKIN: no suspicious lesions or rashes     NEURO: Normal strength and tone, sensory exam grossly normal, mentation intact and speech normal     PSYCH: mentation appears normal. and affect normal/bright     LYMPHATICS: No cervical adenopathy    Recent Labs   Lab Test 02/17/22  1046 01/17/22  0834 01/16/22  0709 01/15/22  0746 01/14/22  0720 01/12/22  0914 01/11/22  1201   HGB  --  13.2 12.6 12.4 12.3   < > 12.9   PLT  --  769* 677* 629* 615*   < > 455*   INR  --   --   --  1.05 1.02   < >  --     139 138 139 139   < > 135*   POTASSIUM 4.3 4.5 4.4 5.2* 4.6   < > 4.0   CR 0.74 0.63 0.61 0.61 0.62   < > 0.70   A1C 5.9*  --   --   --   --   --  6.2*    < > = values in this interval not displayed.        Diagnostics:  Labs pending at this time.  Results will be reviewed when available.   EKG: appears normal, NSR, no LVH by voltage criteria, unchanged from previous tracings, occasional ectopic beat.   Patient denies chest pain/palpitations/sob.     Revised Cardiac Risk Index (RCRI):  The patient has the following serious cardiovascular risks for perioperative complications:   - No serious cardiac risks = 0 points     RCRI Interpretation: 0 points: Class I (very low risk - 0.4% complication rate)         Signed Electronically by: Cedric Kline PA-C  Copy of this evaluation report is provided to requesting physician.

## 2023-10-02 NOTE — PROGRESS NOTES
[facility] [place]  [address]  [place] [address]  Phone: [phone]  Primary Provider: Jessica Panda  Pre-op Performing Provider: MILADIS MAO      PREOPERATIVE EVALUATION:  Today's date: 10/2/2023    Paulina is a 78 year old female who presents for a preoperative evaluation.      10/2/2023     2:04 PM   Additional Questions   Roomed by Jackelyn Spear CMA   Accompanied by Laura - Sister in law         10/2/2023     2:04 PM   Patient Reported Additional Medications   Patient reports taking the following new medications none       Surgical Information:  Surgery/Procedure: Right Hip Replacement  Surgery Location: [facility]  Surgeon: Dr. Bueno  Surgery Date: 10/27/23  Time of Surgery: To be determined  Where patient plans to recover: At home with family  Fax number for surgical facility: Note does not need to be faxed, will be available electronically in Epic.    Paulina was seen today for pre-op exam and health maintenance.    Diagnoses and all orders for this visit:    Preop general physical exam    Primary osteoarthritis of right hip  -     Basic metabolic panel  (Ca, Cl, CO2, Creat, Gluc, K, Na, BUN); Future  -     CBC with platelets and differential; Future  -     EKG 12-lead complete w/read - Clinics  -     CBC with platelets and differential  -     Basic metabolic panel  (Ca, Cl, CO2, Creat, Gluc, K, Na, BUN)    Other orders  -     REVIEW OF HEALTH MAINTENANCE PROTOCOL ORDERS        Paulina is cleared for surgery and appropriate anesthesia.  Subjective       HPI related to upcoming procedure: Right Hip Replacement        10/2/2023     1:52 PM   Preop Questions   1. Have you ever had a heart attack or stroke? No   2. Have you ever had surgery on your heart or blood vessels, such as a stent placement, a coronary artery bypass, or surgery on an artery in your head, neck, heart, or legs? No   3. Do you have chest pain with activity? No   4. Do you have a  history of  heart failure? No   5. Do you currently have a cold, bronchitis or symptoms of other infection? No   6. Do you have a cough, shortness of breath, or wheezing? No   7. Do you or anyone in your family have previous history of blood clots? No   8. Do you or does anyone in your family have a serious bleeding problem such as prolonged bleeding following surgeries or cuts? No   9. Have you ever had problems with anemia or been told to take iron pills? No   10. Have you had any abnormal blood loss such as black, tarry or bloody stools, or abnormal vaginal bleeding? No   11. Have you ever had a blood transfusion? No   12. Are you willing to have a blood transfusion if it is medically needed before, during, or after your surgery? Yes   13. Have you or any of your relatives ever had problems with anesthesia? No   14. Do you have sleep apnea, excessive snoring or daytime drowsiness? No   15. Do you have any artifical heart valves or other implanted medical devices like a pacemaker, defibrillator, or continuous glucose monitor? No   16. Do you have artificial joints? No   17. Are you allergic to latex? No       Health Care Directive:  Patient does not have a Health Care Directive or Living Will: Discussed advance care planning with patient; however, patient declined at this time.    Preoperative Review of :   reviewed - no record of controlled substances prescribed.      Status of Chronic Conditions:  See problem list for active medical problems.  Problems all longstanding and stable, except as noted/documented.  See ROS for pertinent symptoms related to these conditions.    Review of Systems  CONSTITUTIONAL: NEGATIVE for fever, chills, change in weight  INTEGUMENTARY/SKIN: NEGATIVE for worrisome rashes, moles or lesions  EYES: NEGATIVE for vision changes or irritation  ENT/MOUTH: NEGATIVE for ear, mouth and throat problems  RESP: NEGATIVE for significant cough or SOB  CV: NEGATIVE for chest pain, palpitations  "or peripheral edema  GI: NEGATIVE for nausea, abdominal pain, heartburn, or change in bowel habits  : NEGATIVE for frequency, dysuria, or hematuria  MUSCULOSKELETAL: NEGATIVE for significant arthralgias or myalgia  NEURO: NEGATIVE for weakness, dizziness or paresthesias  ENDOCRINE: NEGATIVE for temperature intolerance, skin/hair changes  HEME: NEGATIVE for bleeding problems  PSYCHIATRIC: NEGATIVE for changes in mood or affect    Patient Active Problem List    Diagnosis Date Noted    Transaminitis 01/11/2022     Priority: Medium    Pneumonia due to 2019 novel coronavirus 01/11/2022     Priority: Medium    Osteoporosis      Priority: Medium     Created by Conversion  Replacement Utility updated for latest IMO load        Anxiety      Priority: Medium     Created by Conversion  Replacement Utility updated for latest IMO load        Hyperlipidemia      Priority: Medium     Created by Conversion          No past medical history on file.  Past Surgical History:   Procedure Laterality Date    C UNLISTED PROCEDURE, ABDOMEN/PERITONEUM/OMENTUM      Description: Hernia Repair;  Recorded: 07/28/2008;    HC REMOVAL OF TONSILS,<12 Y/O      Description: Tonsillectomy;  Recorded: 07/28/2008;    ORTHOPEDIC SURGERY       Current Outpatient Medications   Medication Sig Dispense Refill    Glucosamine HCl (GLUCOSAMINE PO)       multivitamin w/minerals (MULTI-VITAMIN) tablet Take 1 tablet by mouth daily         No Known Allergies     Social History     Tobacco Use    Smoking status: Never    Smokeless tobacco: Never   Substance Use Topics    Alcohol use: Not Currently     No family history on file.  History   Drug Use Unknown         Objective     /74   Pulse 72   Temp 97.4  F (36.3  C) (Temporal)   Resp 20   Ht 1.619 m (5' 3.75\")   Wt 74.9 kg (165 lb 3.2 oz)   LMP  (LMP Unknown)   SpO2 95%   BMI 28.58 kg/m      Physical Exam    GENERAL APPEARANCE: healthy, alert and no distress     EYES: EOMI, PERRL     HENT: ear canals " and TM's normal and nose and mouth without ulcers or lesions     NECK: no adenopathy, no asymmetry, masses, or scars and thyroid normal to palpation     RESP: lungs clear to auscultation - no rales, rhonchi or wheezes     CV: regular rates and rhythm, normal S1 S2, no S3 or S4 and no murmur, click or rub     ABDOMEN:  soft, nontender, no HSM or masses and bowel sounds normal     MS: extremities normal- no gross deformities noted, no evidence of inflammation in joints, FROM in all extremities.     SKIN: no suspicious lesions or rashes     NEURO: Normal strength and tone, sensory exam grossly normal, mentation intact and speech normal     PSYCH: mentation appears normal. and affect normal/bright     LYMPHATICS: No cervical adenopathy    Recent Labs   Lab Test 02/17/22  1046 01/17/22  0834 01/16/22  0709 01/15/22  0746 01/14/22  0720 01/12/22  0914 01/11/22  1201   HGB  --  13.2 12.6 12.4 12.3   < > 12.9   PLT  --  769* 677* 629* 615*   < > 455*   INR  --   --   --  1.05 1.02   < >  --     139 138 139 139   < > 135*   POTASSIUM 4.3 4.5 4.4 5.2* 4.6   < > 4.0   CR 0.74 0.63 0.61 0.61 0.62   < > 0.70   A1C 5.9*  --   --   --   --   --  6.2*    < > = values in this interval not displayed.        Diagnostics:  Labs pending at this time.  Results will be reviewed when available.   EKG: appears normal, NSR, no LVH by voltage criteria, unchanged from previous tracings, occasional ectopic beat.   Patient denies chest pain/palpitations/sob.     Revised Cardiac Risk Index (RCRI):  The patient has the following serious cardiovascular risks for perioperative complications:   - No serious cardiac risks = 0 points     RCRI Interpretation: 0 points: Class I (very low risk - 0.4% complication rate)         Signed Electronically by: Cedric Kline PA-C  Copy of this evaluation report is provided to requesting physician.

## 2023-10-03 LAB
ANION GAP SERPL CALCULATED.3IONS-SCNC: 14 MMOL/L (ref 7–15)
BUN SERPL-MCNC: 23.8 MG/DL (ref 8–23)
CALCIUM SERPL-MCNC: 10 MG/DL (ref 8.8–10.2)
CHLORIDE SERPL-SCNC: 105 MMOL/L (ref 98–107)
CREAT SERPL-MCNC: 0.83 MG/DL (ref 0.51–0.95)
DEPRECATED HCO3 PLAS-SCNC: 21 MMOL/L (ref 22–29)
EGFRCR SERPLBLD CKD-EPI 2021: 72 ML/MIN/1.73M2
GLUCOSE SERPL-MCNC: 94 MG/DL (ref 70–99)
POTASSIUM SERPL-SCNC: 4.4 MMOL/L (ref 3.4–5.3)
SODIUM SERPL-SCNC: 140 MMOL/L (ref 135–145)

## 2023-10-11 ENCOUNTER — TELEPHONE (OUTPATIENT)
Dept: FAMILY MEDICINE | Facility: CLINIC | Age: 78
End: 2023-10-11
Payer: COMMERCIAL

## 2023-10-11 NOTE — TELEPHONE ENCOUNTER
Forms/Letter Request    Type of form/letter:  Lab results    Have you been seen for this request: Yes 10/02/2023    Do we have the form/letter: No, pt requesting lab blood work results to be sent via mail    Who is the form from? Provider    Where did/will the form come from? Provider - pt lab work results    When is form/letter needed by: ASAP    How would you like the form/letter returned: Mail  Is this the correct address?: Yes  [address] [facility] [address] [id number]    Patient Notified form requests are processed in 3-5 business days:Yes    Okay to leave a detailed message?: pt declines - will expect via mail

## 2023-10-25 NOTE — OR NURSING
Patient called Same day surgery PACU inquiring about pre-op instructions and surgery time. Author assured patient that the Pre-op caller from [facility] would be calling tomorrow, 10/26 to go over pre-op instructions and provide the official start time for surgery. Author did inform patient that surgery was scheduled for 0940, but iterated that the time could change before she is called by the pre-op  caller.

## 2023-10-26 RX ORDER — MULTIVITAMIN,THERAPEUTIC
1 TABLET ORAL DAILY
COMMUNITY
End: 2023-10-26

## 2023-10-26 RX ORDER — IBUPROFEN 200 MG
200 TABLET ORAL 2 TIMES DAILY PRN
Status: ON HOLD | COMMUNITY
End: 2023-10-28

## 2023-10-26 RX ORDER — ACETAMINOPHEN 500 MG
500 TABLET ORAL EVERY 6 HOURS PRN
Status: ON HOLD | COMMUNITY
End: 2023-10-28

## 2023-10-26 RX ORDER — ASCORBIC ACID
500 CRYSTALS ORAL DAILY
COMMUNITY

## 2023-10-26 RX ORDER — ASPIRIN 81 MG/1
81 TABLET, CHEWABLE ORAL
Status: ON HOLD | COMMUNITY
End: 2023-10-28

## 2023-10-26 NOTE — PROGRESS NOTES
PTA medications updated by Medication Scribe prior to surgery via phone call with patient (last doses completed by Nurse)     Medication history sources: Patient, Surescripts, and H&P  In the past week, patient estimated taking medication this percent of the time: Greater than 90%      Significant changes made to the medication list:  None      Additional medication history information:   None    Medication reconciliation completed by provider prior to medication history? No    Time spent in this activity: 30 minutes      The information provided in this note is only as accurate as the sources available at the time of update(s)      Prior to Admission medications    Medication Sig Last Dose Taking? Auth Provider Long Term End Date   acetaminophen (TYLENOL) 500 MG tablet Take 500 mg by mouth every 6 hours as needed for mild pain 10/26/2023 at pm Yes Reported, Patient No    aspirin (ASA) 81 MG chewable tablet Take 81 mg by mouth three times a week Mon,wed,fri unknow at unknown Yes Reported, Patient No    calcium pantothenate 500 MG TABS Take 500 mg by mouth daily 10/26/2023 at pm Yes Reported, Patient     Glucosamine HCl (GLUCOSAMINE PO) Take 1,500 mg by mouth 2 times daily 10/26 at am Yes Reported, Patient     ibuprofen (ADVIL/MOTRIN) 200 MG tablet Take 200 mg by mouth 2 times daily as needed for pain 10/26/2023 at am Yes Reported, Patient     multivitamin w/minerals (MULTI-VITAMIN) tablet Take 1 tablet by mouth daily 10/26/2023 at am Yes Unknown, Entered By History

## 2023-10-27 ENCOUNTER — ANESTHESIA EVENT (OUTPATIENT)
Dept: SURGERY | Facility: CLINIC | Age: 78
End: 2023-10-27
Payer: COMMERCIAL

## 2023-10-27 ENCOUNTER — HOSPITAL ENCOUNTER (OUTPATIENT)
Facility: CLINIC | Age: 78
Discharge: HOME | End: 2023-10-28
Attending: ORTHOPAEDIC SURGERY | Admitting: ORTHOPAEDIC SURGERY
Payer: COMMERCIAL

## 2023-10-27 ENCOUNTER — APPOINTMENT (OUTPATIENT)
Dept: GENERAL RADIOLOGY | Facility: CLINIC | Age: 78
End: 2023-10-27
Payer: COMMERCIAL

## 2023-10-27 ENCOUNTER — APPOINTMENT (OUTPATIENT)
Dept: PHYSICAL THERAPY | Facility: CLINIC | Age: 78
End: 2023-10-27
Attending: ORTHOPAEDIC SURGERY
Payer: COMMERCIAL

## 2023-10-27 ENCOUNTER — ANESTHESIA (OUTPATIENT)
Dept: SURGERY | Facility: CLINIC | Age: 78
End: 2023-10-27
Payer: COMMERCIAL

## 2023-10-27 DIAGNOSIS — Z96.641 STATUS POST TOTAL REPLACEMENT OF RIGHT HIP: Primary | ICD-10-CM

## 2023-10-27 LAB
CREAT SERPL-MCNC: 0.73 MG/DL (ref 0.51–0.95)
EGFRCR SERPLBLD CKD-EPI 2021: 84 ML/MIN/1.73M2
FASTING STATUS PATIENT QL REPORTED: YES
GLUCOSE SERPL-MCNC: 107 MG/DL (ref 70–99)
HGB BLD-MCNC: 12.7 G/DL (ref 11.7–15.7)

## 2023-10-27 PROCEDURE — 250N000011 HC RX IP 250 OP 636: Mod: JZ | Performed by: ANESTHESIOLOGY

## 2023-10-27 PROCEDURE — 82947 ASSAY GLUCOSE BLOOD QUANT: CPT | Performed by: ANESTHESIOLOGY

## 2023-10-27 PROCEDURE — 258N000003 HC RX IP 258 OP 636: Performed by: ANESTHESIOLOGY

## 2023-10-27 PROCEDURE — 370N000017 HC ANESTHESIA TECHNICAL FEE, PER MIN: Performed by: ORTHOPAEDIC SURGERY

## 2023-10-27 PROCEDURE — 250N000011 HC RX IP 250 OP 636: Performed by: NURSE ANESTHETIST, CERTIFIED REGISTERED

## 2023-10-27 PROCEDURE — C1776 JOINT DEVICE (IMPLANTABLE): HCPCS | Performed by: ORTHOPAEDIC SURGERY

## 2023-10-27 PROCEDURE — 250N000011 HC RX IP 250 OP 636: Performed by: ORTHOPAEDIC SURGERY

## 2023-10-27 PROCEDURE — 36415 COLL VENOUS BLD VENIPUNCTURE: CPT

## 2023-10-27 PROCEDURE — 710N000009 HC RECOVERY PHASE 1, LEVEL 1, PER MIN: Performed by: ORTHOPAEDIC SURGERY

## 2023-10-27 PROCEDURE — 258N000003 HC RX IP 258 OP 636

## 2023-10-27 PROCEDURE — 97530 THERAPEUTIC ACTIVITIES: CPT | Mod: GP

## 2023-10-27 PROCEDURE — 250N000013 HC RX MED GY IP 250 OP 250 PS 637

## 2023-10-27 PROCEDURE — 272N000001 HC OR GENERAL SUPPLY STERILE: Performed by: ORTHOPAEDIC SURGERY

## 2023-10-27 PROCEDURE — 97161 PT EVAL LOW COMPLEX 20 MIN: CPT | Mod: GP

## 2023-10-27 PROCEDURE — 250N000011 HC RX IP 250 OP 636

## 2023-10-27 PROCEDURE — C1713 ANCHOR/SCREW BN/BN,TIS/BN: HCPCS | Performed by: ORTHOPAEDIC SURGERY

## 2023-10-27 PROCEDURE — 250N000011 HC RX IP 250 OP 636: Mod: JZ | Performed by: NURSE ANESTHETIST, CERTIFIED REGISTERED

## 2023-10-27 PROCEDURE — 250N000009 HC RX 250: Performed by: NURSE ANESTHETIST, CERTIFIED REGISTERED

## 2023-10-27 PROCEDURE — 999N000065 XR PELVIS PORT 1/2 VIEWS

## 2023-10-27 PROCEDURE — 250N000009 HC RX 250: Performed by: ORTHOPAEDIC SURGERY

## 2023-10-27 PROCEDURE — 360N000077 HC SURGERY LEVEL 4, PER MIN: Performed by: ORTHOPAEDIC SURGERY

## 2023-10-27 PROCEDURE — 85018 HEMOGLOBIN: CPT

## 2023-10-27 PROCEDURE — 82565 ASSAY OF CREATININE: CPT

## 2023-10-27 PROCEDURE — 97116 GAIT TRAINING THERAPY: CPT | Mod: GP

## 2023-10-27 PROCEDURE — 250N000011 HC RX IP 250 OP 636: Mod: JZ

## 2023-10-27 PROCEDURE — 999N000141 HC STATISTIC PRE-PROCEDURE NURSING ASSESSMENT: Performed by: ORTHOPAEDIC SURGERY

## 2023-10-27 PROCEDURE — 258N000003 HC RX IP 258 OP 636: Performed by: NURSE ANESTHETIST, CERTIFIED REGISTERED

## 2023-10-27 DEVICE — BONE CEMENT SIMPLEX FULL DOSE 6191-1-001: Type: IMPLANTABLE DEVICE | Site: HIP | Status: FUNCTIONAL

## 2023-10-27 DEVICE — TRIDENT X3 0 DEGREE POLYETHYLENE INSERT
Type: IMPLANTABLE DEVICE | Site: HIP | Status: FUNCTIONAL
Brand: TRIDENT X3 INSERT

## 2023-10-27 DEVICE — V40 STEM
Type: IMPLANTABLE DEVICE | Site: HIP | Status: FUNCTIONAL
Brand: EXETER

## 2023-10-27 DEVICE — 6.5MM LOW PROFILE HEX SCREW 35MM
Type: IMPLANTABLE DEVICE | Site: HIP | Status: FUNCTIONAL
Brand: TRIDENT II

## 2023-10-27 DEVICE — TRIDENT II TRITANIUM CLUSTER 50D
Type: IMPLANTABLE DEVICE | Site: HIP | Status: FUNCTIONAL
Brand: TRIDENT II

## 2023-10-27 DEVICE — 6.5MM LOW PROFILE HEX SCREW 25MM
Type: IMPLANTABLE DEVICE | Site: HIP | Status: FUNCTIONAL
Brand: TRIDENT II

## 2023-10-27 DEVICE — CERAMIC V40 FEMORAL HEAD
Type: IMPLANTABLE DEVICE | Site: HIP | Status: FUNCTIONAL
Brand: BIOLOX

## 2023-10-27 RX ORDER — EPHEDRINE SULFATE 50 MG/ML
INJECTION, SOLUTION INTRAMUSCULAR; INTRAVENOUS; SUBCUTANEOUS PRN
Status: DISCONTINUED | OUTPATIENT
Start: 2023-10-27 | End: 2023-10-27

## 2023-10-27 RX ORDER — OXYCODONE HYDROCHLORIDE 5 MG/1
10 TABLET ORAL EVERY 4 HOURS PRN
Status: DISCONTINUED | OUTPATIENT
Start: 2023-10-27 | End: 2023-10-28 | Stop reason: HOSPADM

## 2023-10-27 RX ORDER — BISACODYL 10 MG
10 SUPPOSITORY, RECTAL RECTAL DAILY PRN
Status: DISCONTINUED | OUTPATIENT
Start: 2023-10-27 | End: 2023-10-28 | Stop reason: HOSPADM

## 2023-10-27 RX ORDER — ASPIRIN 81 MG/1
81 TABLET ORAL 2 TIMES DAILY
Status: DISCONTINUED | OUTPATIENT
Start: 2023-10-27 | End: 2023-10-28 | Stop reason: HOSPADM

## 2023-10-27 RX ORDER — HYDRALAZINE HYDROCHLORIDE 20 MG/ML
2.5-5 INJECTION INTRAMUSCULAR; INTRAVENOUS EVERY 10 MIN PRN
Status: DISCONTINUED | OUTPATIENT
Start: 2023-10-27 | End: 2023-10-27 | Stop reason: HOSPADM

## 2023-10-27 RX ORDER — ONDANSETRON 2 MG/ML
4 INJECTION INTRAMUSCULAR; INTRAVENOUS EVERY 30 MIN PRN
Status: CANCELLED | OUTPATIENT
Start: 2023-10-27

## 2023-10-27 RX ORDER — ONDANSETRON 2 MG/ML
INJECTION INTRAMUSCULAR; INTRAVENOUS PRN
Status: DISCONTINUED | OUTPATIENT
Start: 2023-10-27 | End: 2023-10-27

## 2023-10-27 RX ORDER — ALBUTEROL SULFATE 0.83 MG/ML
2.5 SOLUTION RESPIRATORY (INHALATION) EVERY 4 HOURS PRN
Status: DISCONTINUED | OUTPATIENT
Start: 2023-10-27 | End: 2023-10-27 | Stop reason: HOSPADM

## 2023-10-27 RX ORDER — NALOXONE HYDROCHLORIDE 0.4 MG/ML
0.2 INJECTION, SOLUTION INTRAMUSCULAR; INTRAVENOUS; SUBCUTANEOUS
Status: DISCONTINUED | OUTPATIENT
Start: 2023-10-27 | End: 2023-10-28 | Stop reason: HOSPADM

## 2023-10-27 RX ORDER — FENTANYL CITRATE 0.05 MG/ML
25 INJECTION, SOLUTION INTRAMUSCULAR; INTRAVENOUS EVERY 5 MIN PRN
Status: DISCONTINUED | OUTPATIENT
Start: 2023-10-27 | End: 2023-10-27 | Stop reason: HOSPADM

## 2023-10-27 RX ORDER — HYDROMORPHONE HCL IN WATER/PF 6 MG/30 ML
0.2 PATIENT CONTROLLED ANALGESIA SYRINGE INTRAVENOUS EVERY 5 MIN PRN
Status: DISCONTINUED | OUTPATIENT
Start: 2023-10-27 | End: 2023-10-27 | Stop reason: HOSPADM

## 2023-10-27 RX ORDER — TRANEXAMIC ACID 650 MG/1
1950 TABLET ORAL ONCE
Status: COMPLETED | OUTPATIENT
Start: 2023-10-27 | End: 2023-10-27

## 2023-10-27 RX ORDER — NALOXONE HYDROCHLORIDE 0.4 MG/ML
0.4 INJECTION, SOLUTION INTRAMUSCULAR; INTRAVENOUS; SUBCUTANEOUS
Status: DISCONTINUED | OUTPATIENT
Start: 2023-10-27 | End: 2023-10-28 | Stop reason: HOSPADM

## 2023-10-27 RX ORDER — HYDROMORPHONE HYDROCHLORIDE 1 MG/ML
INJECTION, SOLUTION INTRAMUSCULAR; INTRAVENOUS; SUBCUTANEOUS PRN
Status: DISCONTINUED | OUTPATIENT
Start: 2023-10-27 | End: 2023-10-27

## 2023-10-27 RX ORDER — AMOXICILLIN 250 MG
1-2 CAPSULE ORAL 2 TIMES DAILY
Qty: 30 TABLET | Refills: 0 | Status: SHIPPED | OUTPATIENT
Start: 2023-10-27

## 2023-10-27 RX ORDER — POLYETHYLENE GLYCOL 3350 17 G/17G
17 POWDER, FOR SOLUTION ORAL DAILY
Status: DISCONTINUED | OUTPATIENT
Start: 2023-10-28 | End: 2023-10-28 | Stop reason: HOSPADM

## 2023-10-27 RX ORDER — POLYETHYLENE GLYCOL 3350 17 G/17G
1 POWDER, FOR SOLUTION ORAL DAILY
Qty: 7 PACKET | Refills: 0 | Status: SHIPPED | OUTPATIENT
Start: 2023-10-27

## 2023-10-27 RX ORDER — BUPIVACAINE HYDROCHLORIDE 7.5 MG/ML
INJECTION, SOLUTION INTRASPINAL
Status: DISCONTINUED | OUTPATIENT
Start: 2023-10-27 | End: 2023-10-27

## 2023-10-27 RX ORDER — PROPOFOL 10 MG/ML
INJECTION, EMULSION INTRAVENOUS PRN
Status: DISCONTINUED | OUTPATIENT
Start: 2023-10-27 | End: 2023-10-27

## 2023-10-27 RX ORDER — LIDOCAINE 40 MG/G
CREAM TOPICAL
Status: DISCONTINUED | OUTPATIENT
Start: 2023-10-27 | End: 2023-10-28 | Stop reason: HOSPADM

## 2023-10-27 RX ORDER — LABETALOL HYDROCHLORIDE 5 MG/ML
10 INJECTION, SOLUTION INTRAVENOUS
Status: DISCONTINUED | OUTPATIENT
Start: 2023-10-27 | End: 2023-10-27 | Stop reason: HOSPADM

## 2023-10-27 RX ORDER — FAMOTIDINE 20 MG/1
20 TABLET, FILM COATED ORAL 2 TIMES DAILY
Status: DISCONTINUED | OUTPATIENT
Start: 2023-10-27 | End: 2023-10-28 | Stop reason: HOSPADM

## 2023-10-27 RX ORDER — HYDROXYZINE HYDROCHLORIDE 10 MG/1
10 TABLET, FILM COATED ORAL EVERY 6 HOURS PRN
Status: DISCONTINUED | OUTPATIENT
Start: 2023-10-27 | End: 2023-10-28 | Stop reason: HOSPADM

## 2023-10-27 RX ORDER — ASPIRIN 81 MG/1
81 TABLET ORAL 2 TIMES DAILY
Qty: 90 TABLET | Refills: 0 | Status: SHIPPED | OUTPATIENT
Start: 2023-10-27

## 2023-10-27 RX ORDER — DEXAMETHASONE SODIUM PHOSPHATE 4 MG/ML
INJECTION, SOLUTION INTRA-ARTICULAR; INTRALESIONAL; INTRAMUSCULAR; INTRAVENOUS; SOFT TISSUE PRN
Status: DISCONTINUED | OUTPATIENT
Start: 2023-10-27 | End: 2023-10-27

## 2023-10-27 RX ORDER — ONDANSETRON 4 MG/1
4 TABLET, ORALLY DISINTEGRATING ORAL EVERY 30 MIN PRN
Status: DISCONTINUED | OUTPATIENT
Start: 2023-10-27 | End: 2023-10-27 | Stop reason: HOSPADM

## 2023-10-27 RX ORDER — ONDANSETRON 2 MG/ML
4 INJECTION INTRAMUSCULAR; INTRAVENOUS EVERY 6 HOURS PRN
Status: DISCONTINUED | OUTPATIENT
Start: 2023-10-27 | End: 2023-10-28 | Stop reason: HOSPADM

## 2023-10-27 RX ORDER — HYDROMORPHONE HCL IN WATER/PF 6 MG/30 ML
0.4 PATIENT CONTROLLED ANALGESIA SYRINGE INTRAVENOUS EVERY 5 MIN PRN
Status: DISCONTINUED | OUTPATIENT
Start: 2023-10-27 | End: 2023-10-27 | Stop reason: HOSPADM

## 2023-10-27 RX ORDER — ACETAMINOPHEN 325 MG/1
650 TABLET ORAL EVERY 4 HOURS PRN
Qty: 100 TABLET | Refills: 0 | Status: SHIPPED | OUTPATIENT
Start: 2023-10-27

## 2023-10-27 RX ORDER — AMOXICILLIN 250 MG
1 CAPSULE ORAL 2 TIMES DAILY
Status: DISCONTINUED | OUTPATIENT
Start: 2023-10-27 | End: 2023-10-28 | Stop reason: HOSPADM

## 2023-10-27 RX ORDER — ACETAMINOPHEN 325 MG/1
975 TABLET ORAL EVERY 8 HOURS
Qty: 27 TABLET | Refills: 0 | Status: DISCONTINUED | OUTPATIENT
Start: 2023-10-27 | End: 2023-10-28 | Stop reason: HOSPADM

## 2023-10-27 RX ORDER — CELECOXIB 100 MG/1
100 CAPSULE ORAL 2 TIMES DAILY
Qty: 4 CAPSULE | Refills: 0 | Status: DISCONTINUED | OUTPATIENT
Start: 2023-10-27 | End: 2023-10-28 | Stop reason: HOSPADM

## 2023-10-27 RX ORDER — ONDANSETRON 2 MG/ML
4 INJECTION INTRAMUSCULAR; INTRAVENOUS EVERY 30 MIN PRN
Status: DISCONTINUED | OUTPATIENT
Start: 2023-10-27 | End: 2023-10-27 | Stop reason: HOSPADM

## 2023-10-27 RX ORDER — HYDROMORPHONE HCL IN WATER/PF 6 MG/30 ML
0.4 PATIENT CONTROLLED ANALGESIA SYRINGE INTRAVENOUS
Status: DISCONTINUED | OUTPATIENT
Start: 2023-10-27 | End: 2023-10-28 | Stop reason: HOSPADM

## 2023-10-27 RX ORDER — OXYCODONE HYDROCHLORIDE 5 MG/1
5 TABLET ORAL EVERY 4 HOURS PRN
Status: DISCONTINUED | OUTPATIENT
Start: 2023-10-27 | End: 2023-10-28 | Stop reason: HOSPADM

## 2023-10-27 RX ORDER — ONDANSETRON 4 MG/1
4 TABLET, ORALLY DISINTEGRATING ORAL EVERY 30 MIN PRN
Status: CANCELLED | OUTPATIENT
Start: 2023-10-27

## 2023-10-27 RX ORDER — OXYCODONE HYDROCHLORIDE 5 MG/1
10 TABLET ORAL
Status: CANCELLED | OUTPATIENT
Start: 2023-10-27

## 2023-10-27 RX ORDER — PROPOFOL 10 MG/ML
INJECTION, EMULSION INTRAVENOUS CONTINUOUS PRN
Status: DISCONTINUED | OUTPATIENT
Start: 2023-10-27 | End: 2023-10-27

## 2023-10-27 RX ORDER — FENTANYL CITRATE 0.05 MG/ML
50 INJECTION, SOLUTION INTRAMUSCULAR; INTRAVENOUS EVERY 5 MIN PRN
Status: DISCONTINUED | OUTPATIENT
Start: 2023-10-27 | End: 2023-10-27 | Stop reason: HOSPADM

## 2023-10-27 RX ORDER — OXYCODONE HYDROCHLORIDE 5 MG/1
5 TABLET ORAL
Status: CANCELLED | OUTPATIENT
Start: 2023-10-27

## 2023-10-27 RX ORDER — FENTANYL CITRATE 0.05 MG/ML
25 INJECTION, SOLUTION INTRAMUSCULAR; INTRAVENOUS
Status: CANCELLED | OUTPATIENT
Start: 2023-10-27

## 2023-10-27 RX ORDER — SODIUM CHLORIDE, SODIUM LACTATE, POTASSIUM CHLORIDE, CALCIUM CHLORIDE 600; 310; 30; 20 MG/100ML; MG/100ML; MG/100ML; MG/100ML
INJECTION, SOLUTION INTRAVENOUS CONTINUOUS PRN
Status: DISCONTINUED | OUTPATIENT
Start: 2023-10-27 | End: 2023-10-27

## 2023-10-27 RX ORDER — ONDANSETRON 4 MG/1
4 TABLET, ORALLY DISINTEGRATING ORAL EVERY 6 HOURS PRN
Status: DISCONTINUED | OUTPATIENT
Start: 2023-10-27 | End: 2023-10-28 | Stop reason: HOSPADM

## 2023-10-27 RX ORDER — SODIUM CHLORIDE, SODIUM LACTATE, POTASSIUM CHLORIDE, CALCIUM CHLORIDE 600; 310; 30; 20 MG/100ML; MG/100ML; MG/100ML; MG/100ML
INJECTION, SOLUTION INTRAVENOUS CONTINUOUS
Status: DISCONTINUED | OUTPATIENT
Start: 2023-10-27 | End: 2023-10-27 | Stop reason: HOSPADM

## 2023-10-27 RX ORDER — SODIUM CHLORIDE, SODIUM LACTATE, POTASSIUM CHLORIDE, CALCIUM CHLORIDE 600; 310; 30; 20 MG/100ML; MG/100ML; MG/100ML; MG/100ML
INJECTION, SOLUTION INTRAVENOUS CONTINUOUS
Status: DISCONTINUED | OUTPATIENT
Start: 2023-10-27 | End: 2023-10-28 | Stop reason: HOSPADM

## 2023-10-27 RX ORDER — MEPERIDINE HYDROCHLORIDE 25 MG/ML
12.5 INJECTION INTRAMUSCULAR; INTRAVENOUS; SUBCUTANEOUS EVERY 5 MIN PRN
Status: DISCONTINUED | OUTPATIENT
Start: 2023-10-27 | End: 2023-10-27 | Stop reason: HOSPADM

## 2023-10-27 RX ORDER — VANCOMYCIN HYDROCHLORIDE 1 G/20ML
INJECTION, POWDER, LYOPHILIZED, FOR SOLUTION INTRAVENOUS PRN
Status: DISCONTINUED | OUTPATIENT
Start: 2023-10-27 | End: 2023-10-27 | Stop reason: HOSPADM

## 2023-10-27 RX ORDER — HYDROMORPHONE HCL IN WATER/PF 6 MG/30 ML
0.2 PATIENT CONTROLLED ANALGESIA SYRINGE INTRAVENOUS
Status: DISCONTINUED | OUTPATIENT
Start: 2023-10-27 | End: 2023-10-28 | Stop reason: HOSPADM

## 2023-10-27 RX ORDER — CELECOXIB 100 MG/1
100 CAPSULE ORAL 2 TIMES DAILY
Qty: 28 CAPSULE | Refills: 0 | Status: SHIPPED | OUTPATIENT
Start: 2023-10-27 | End: 2023-11-10

## 2023-10-27 RX ORDER — CEFAZOLIN SODIUM/WATER 2 G/20 ML
2 SYRINGE (ML) INTRAVENOUS SEE ADMIN INSTRUCTIONS
Status: DISCONTINUED | OUTPATIENT
Start: 2023-10-27 | End: 2023-10-27 | Stop reason: HOSPADM

## 2023-10-27 RX ORDER — ACETAMINOPHEN 325 MG/1
650 TABLET ORAL EVERY 4 HOURS PRN
Status: DISCONTINUED | OUTPATIENT
Start: 2023-10-30 | End: 2023-10-28 | Stop reason: HOSPADM

## 2023-10-27 RX ORDER — CEFAZOLIN SODIUM/WATER 2 G/20 ML
2 SYRINGE (ML) INTRAVENOUS
Status: DISCONTINUED | OUTPATIENT
Start: 2023-10-27 | End: 2023-10-27 | Stop reason: HOSPADM

## 2023-10-27 RX ORDER — CEFAZOLIN SODIUM 2 G/100ML
2 INJECTION, SOLUTION INTRAVENOUS EVERY 8 HOURS
Qty: 200 ML | Refills: 0 | Status: COMPLETED | OUTPATIENT
Start: 2023-10-27 | End: 2023-10-28

## 2023-10-27 RX ORDER — PROCHLORPERAZINE MALEATE 5 MG
5 TABLET ORAL EVERY 6 HOURS PRN
Status: DISCONTINUED | OUTPATIENT
Start: 2023-10-27 | End: 2023-10-28 | Stop reason: HOSPADM

## 2023-10-27 RX ORDER — ACETAMINOPHEN 325 MG/1
975 TABLET ORAL ONCE
Status: COMPLETED | OUTPATIENT
Start: 2023-10-27 | End: 2023-10-27

## 2023-10-27 RX ORDER — HYDROXYZINE HYDROCHLORIDE 25 MG/1
25 TABLET, FILM COATED ORAL EVERY 6 HOURS PRN
Qty: 15 TABLET | Refills: 0 | Status: SHIPPED | OUTPATIENT
Start: 2023-10-27

## 2023-10-27 RX ADMIN — SODIUM CHLORIDE, POTASSIUM CHLORIDE, SODIUM LACTATE AND CALCIUM CHLORIDE: 600; 310; 30; 20 INJECTION, SOLUTION INTRAVENOUS at 09:43

## 2023-10-27 RX ADMIN — SODIUM CHLORIDE, POTASSIUM CHLORIDE, SODIUM LACTATE AND CALCIUM CHLORIDE: 600; 310; 30; 20 INJECTION, SOLUTION INTRAVENOUS at 08:53

## 2023-10-27 RX ADMIN — FAMOTIDINE 20 MG: 20 TABLET ORAL at 20:58

## 2023-10-27 RX ADMIN — ACETAMINOPHEN 975 MG: 325 TABLET, FILM COATED ORAL at 17:23

## 2023-10-27 RX ADMIN — CELECOXIB 100 MG: 100 CAPSULE ORAL at 14:13

## 2023-10-27 RX ADMIN — PHENYLEPHRINE HYDROCHLORIDE 100 MCG: 10 INJECTION INTRAVENOUS at 09:18

## 2023-10-27 RX ADMIN — SODIUM CHLORIDE, POTASSIUM CHLORIDE, SODIUM LACTATE AND CALCIUM CHLORIDE: 600; 310; 30; 20 INJECTION, SOLUTION INTRAVENOUS at 08:41

## 2023-10-27 RX ADMIN — PHENYLEPHRINE HYDROCHLORIDE 100 MCG: 10 INJECTION INTRAVENOUS at 09:29

## 2023-10-27 RX ADMIN — DEXAMETHASONE SODIUM PHOSPHATE 10 MG: 4 INJECTION, SOLUTION INTRA-ARTICULAR; INTRALESIONAL; INTRAMUSCULAR; INTRAVENOUS; SOFT TISSUE at 09:29

## 2023-10-27 RX ADMIN — CEFAZOLIN SODIUM 2 G: 2 INJECTION, SOLUTION INTRAVENOUS at 17:23

## 2023-10-27 RX ADMIN — HYDROMORPHONE HYDROCHLORIDE 0.5 MG: 1 INJECTION, SOLUTION INTRAMUSCULAR; INTRAVENOUS; SUBCUTANEOUS at 11:28

## 2023-10-27 RX ADMIN — ACETAMINOPHEN 975 MG: 325 TABLET, FILM COATED ORAL at 08:41

## 2023-10-27 RX ADMIN — ONDANSETRON 4 MG: 2 INJECTION INTRAMUSCULAR; INTRAVENOUS at 11:01

## 2023-10-27 RX ADMIN — PHENYLEPHRINE HYDROCHLORIDE 100 MCG: 10 INJECTION INTRAVENOUS at 09:25

## 2023-10-27 RX ADMIN — Medication 5 MG: at 10:16

## 2023-10-27 RX ADMIN — Medication 7.5 MG: at 09:50

## 2023-10-27 RX ADMIN — PHENYLEPHRINE HYDROCHLORIDE 100 MCG: 10 INJECTION INTRAVENOUS at 09:42

## 2023-10-27 RX ADMIN — TRANEXAMIC ACID 1950 MG: 650 TABLET ORAL at 08:40

## 2023-10-27 RX ADMIN — MIDAZOLAM 1 MG: 1 INJECTION INTRAMUSCULAR; INTRAVENOUS at 09:05

## 2023-10-27 RX ADMIN — PHENYLEPHRINE HYDROCHLORIDE 100 MCG: 10 INJECTION INTRAVENOUS at 09:22

## 2023-10-27 RX ADMIN — ASPIRIN 81 MG: 81 TABLET, COATED ORAL at 20:58

## 2023-10-27 RX ADMIN — BUPIVACAINE HYDROCHLORIDE IN DEXTROSE 1.8 ML: 7.5 INJECTION, SOLUTION SUBARACHNOID at 07:40

## 2023-10-27 RX ADMIN — DOCUSATE SODIUM 50 MG AND SENNOSIDES 8.6 MG 1 TABLET: 8.6; 5 TABLET, FILM COATED ORAL at 20:58

## 2023-10-27 RX ADMIN — Medication 5 MG: at 10:26

## 2023-10-27 RX ADMIN — Medication 2 G: at 09:07

## 2023-10-27 RX ADMIN — PHENYLEPHRINE HYDROCHLORIDE 100 MCG: 10 INJECTION INTRAVENOUS at 09:34

## 2023-10-27 RX ADMIN — SODIUM CHLORIDE, POTASSIUM CHLORIDE, SODIUM LACTATE AND CALCIUM CHLORIDE: 600; 310; 30; 20 INJECTION, SOLUTION INTRAVENOUS at 16:35

## 2023-10-27 RX ADMIN — Medication 7.5 MG: at 09:45

## 2023-10-27 RX ADMIN — PROPOFOL 30 MG: 10 INJECTION, EMULSION INTRAVENOUS at 09:16

## 2023-10-27 RX ADMIN — PHENYLEPHRINE HYDROCHLORIDE 0.5 MCG/KG/MIN: 10 INJECTION INTRAVENOUS at 09:29

## 2023-10-27 RX ADMIN — PROPOFOL 150 MCG/KG/MIN: 10 INJECTION, EMULSION INTRAVENOUS at 09:16

## 2023-10-27 ASSESSMENT — ACTIVITIES OF DAILY LIVING (ADL)
ADLS_ACUITY_SCORE: 20

## 2023-10-27 NOTE — PROGRESS NOTES
10/27/23 5905   Appointment Info   Signing Clinician's Name / Credentials (PT) Sara Galarza, PT, DPT   Rehab Comments (PT) needs FWW for home   Quick Adds   Quick Adds Certification   Living Environment   People in Home other (see comments)   Current Living Arrangements house   Home Accessibility stairs to enter home   Number of Stairs, Main Entrance 1   Stair Railings, Main Entrance railings safe and in good condition   Transportation Anticipated family or friend will provide   Living Environment Comments Family will plan to stay with her while she recovers-- daughters   Self-Care   Usual Activity Tolerance moderate   Current Activity Tolerance fair   Equipment Currently Used at Home walker, rolling;cane, straight   Fall history within last six months no   Activity/Exercise/Self-Care Comment Per pt and family, patient has had limited mobility d/t hip pain since July of this year, used a cane to walk. Owns a 4WW, does not own a FWW. Independent w/ self cares, pt has been helping with higher level support and driving d/t the pain.   General Information   Onset of Illness/Injury or Date of Surgery 10/27/23   Referring Physician Janet Dent PA-C   Patient/Family Therapy Goals Statement (PT) to get better   Pertinent History of Current Problem (include personal factors and/or comorbidities that impact the POC) R OJ   Existing Precautions/Restrictions fall   Weight-Bearing Status - LLE full weight-bearing   Weight-Bearing Status - RLE weight-bearing as tolerated   Cognition   Affect/Mental Status (Cognition) WNL   Cognitive Status Comments hyperverbal, needs cuing to stay on task at times   Pain Assessment   Patient Currently in Pain No  (pt reports no pain throughout session)   Integumentary/Edema   Integumentary/Edema Comments incision w/ bandaging CDI   Posture    Posture Forward head position   Range of Motion (ROM)   Range of Motion ROM deficits secondary to surgical procedure   Strength (Manual Muscle  Testing)   Strength (Manual Muscle Testing) strength is WFL   Strength Comments decreased activity tolernace   Bed Mobility   Comment, (Bed Mobility) SBA supine to seated EOB   Transfers   Comment, (Transfers) CGA with FWW; cuing for hand placement safety   Gait/Stairs (Locomotion)   Comment, (Gait/Stairs) CGA x 80ft with FWW   Balance   Balance Comments steady with FWW   Clinical Impression   Criteria for Skilled Therapeutic Intervention Yes, treatment indicated   PT Diagnosis (PT) impaired functional mobility   Influenced by the following impairments decreased activity tolerance, ROM   Functional limitations due to impairments bed mob, transfers, ambulation, stairs   Clinical Presentation (PT Evaluation Complexity) stable   Clinical Presentation Rationale clinical judgement   Clinical Decision Making (Complexity) low complexity   Planned Therapy Interventions (PT) balance training;bed mobility training;patient/family education;strengthening;stair training;transfer training   Risk & Benefits of therapy have been explained evaluation/treatment results reviewed;care plan/treatment goals reviewed;risks/benefits reviewed;current/potential barriers reviewed;participants voiced agreement with care plan;participants included;patient;spouse/significant other;daughter   PT Total Evaluation Time   PT Eval, Low Complexity Minutes (42756) 11   Therapy Certification   Start of care date 10/27/23   Certification date from 10/27/23   Certification date to 10/31/23   Medical Diagnosis R OJ   Physical Therapy Goals   PT Frequency 2x/day   PT Predicted Duration/Target Date for Goal Attainment 10/31/23   PT Goals Bed Mobility;Transfers;Gait;Stairs   PT: Bed Mobility Independent   PT: Transfers Modified independent;Sit to/from stand;Bed to/from chair;Assistive device   PT: Gait Modified independent;Rolling walker;Greater than 200 feet   PT: Stairs Supervision/stand-by assist;1 stair   Interventions   Interventions Quick Adds  Therapeutic Activity;Gait Training   Therapeutic Activity   Therapeutic Activities: dynamic activities to improve functional performance Minutes (38357) 10   Treatment Detail/Skilled Intervention Reviewed education with pt and family-- WBAT, activity as tolerated, importance of walking program and progression. Discussed benefits of using FWW over 4WW during the healing process and why a FWW is recommended, family and pt agreeable. Supine to seated EOB w/ SBA, pt tolerates upright well. Transfers with FWW with cuing for hand placement safety. Trasnfered to recliner chair with SBA, steady and tolerated well.   Gait Training   Gait Training Minutes (78812) 15   Symptoms Noted During/After Treatment (Gait Training) none   Treatment Detail/Skilled Intervention Ambulation w/ FWW where pt demonstrates slow pace, steady and slightly uneven gait pattern but step through. Tolerated well.   Distance in Feet 300ft   [place] Level (Gait Training) stand-by assist   Physical Assistance Level (Gait Training) verbal cues   Weight Bearing (Gait Training) weight-bearing as tolerated   Assistive Device (Gait Training) rolling walker   PT Discharge Planning   PT Plan progress ambulation w/ FWW; one step with railing   PT Discharge Recommendation (DC Rec)   (defer to ortho)   PT Rationale for DC Rec Patient is mobilizing well on POD 0, anticipated to have support of family at home.   PT Brief overview of current status SBA x 300ft   PT Equipment Needed at Discharge walker, rolling   Total Session Time   Timed Code Treatment Minutes 25   Total Session Time (sum of timed and untimed services) 36       [facility]  OUTPATIENT PHYSICAL THERAPY EVALUATION  PLAN OF TREATMENT FOR OUTPATIENT REHABILITATION  (COMPLETE FOR INITIAL CLAIMS ONLY)  Patient's Last Name, First Name, M.I.  YOB: 1945  Paulina Mendez                        Provider's Name  [facility]  Medical Record No.  [id number]                             Onset Date:  10/27/23   Start of Care Date:  10/27/23   Type:     _X_PT   ___OT   ___SLP Medical Diagnosis:  R OJ              PT Diagnosis:  impaired functional mobility Visits from SOC:  1     See note for plan of treatment, functional goals and certification details    I CERTIFY THE NEED FOR THESE SERVICES FURNISHED UNDER        THIS PLAN OF TREATMENT AND WHILE UNDER MY CARE     (Physician co-signature of this document indicates review and certification of the therapy plan).

## 2023-10-27 NOTE — ANESTHESIA CARE TRANSFER NOTE
Patient: Paulina M[place]    Procedure: Procedure(s):  right total hip arthroplasty       Diagnosis: Primary osteoarthritis of right hip [M16.11]  Diagnosis Additional Information: No value filed.    Anesthesia Type:   Spinal     Note:  Anesthesia Care Transfer Notewriter  Vitals:  Vitals Value Taken Time   /76 10/27/23 1215   Temp 36.2  C (97.1  F) 10/27/23 1149   Pulse 74 10/27/23 1229   Resp 11 10/27/23 1229   SpO2 99 % 10/27/23 1227   Vitals shown include unfiled device data.    Electronically Signed By: Mily Hensley MD, MD  October 27, 2023  1:12 PM

## 2023-10-27 NOTE — PROGRESS NOTES
Arrived from Recovery room.  A&O, able to make needs known.   Oriented to room/unit.  Mild surgical pain/ache, ice pack applied.  Ortho Stoplight Tool discussed w/ pt.  Pt's daughter at the bedside

## 2023-10-27 NOTE — ANESTHESIA PREPROCEDURE EVALUATION
Anesthesia Pre-Procedure Evaluation    Patient: Paulina Mendez   MRN: [id number] : 1945        Procedure : Procedure(s):  right total hip arthroplasty          No past medical history on file.   Past Surgical History:   Procedure Laterality Date    C UNLISTED PROCEDURE, ABDOMEN/PERITONEUM/OMENTUM      Description: Hernia Repair;  Recorded: 2008;    HC REMOVAL OF TONSILS,<11 Y/O      Description: Tonsillectomy;  Recorded: 2008;    ORTHOPEDIC SURGERY        No Known Allergies   Social History     Tobacco Use    Smoking status: Never    Smokeless tobacco: Never   Substance Use Topics    Alcohol use: Not Currently      Wt Readings from Last 1 Encounters:   10/02/23 74.9 kg (165 lb 3.2 oz)        Anesthesia Evaluation   Pt has had prior anesthetic.     No history of anesthetic complications       ROS/MED HX  ENT/Pulmonary:    (-) sleep apnea   Neurologic:    (-) no CVA   Cardiovascular:    (-) CAD   METS/Exercise Tolerance:     Hematologic:       Musculoskeletal:   (+)  arthritis,             GI/Hepatic:    (-) GERD   Renal/Genitourinary:       Endo:    (-) Type II DM   Psychiatric/Substance Use:       Infectious Disease:       Malignancy:       Other:            Physical Exam    Airway        Mallampati: II   TM distance: > 3 FB   Neck ROM: full   Mouth opening: > 3 cm    Respiratory Devices and Support         Dental  no notable dental history         Cardiovascular   cardiovascular exam normal          Pulmonary   pulmonary exam normal                OUTSIDE LABS:  CBC:   Lab Results   Component Value Date    WBC 8.4 10/02/2023    WBC 10.5 2022    HGB 13.4 10/02/2023    HGB 13.2 2022    HCT 40.9 10/02/2023    HCT 41.8 2022     10/02/2023     (H) 2022     BMP:   Lab Results   Component Value Date     10/02/2023     2022    POTASSIUM 4.4 10/02/2023    POTASSIUM 4.3 2022    CHLORIDE 105 10/02/2023    CHLORIDE 104 2022    CO2  "21 (L) 10/02/2023    CO2 23 02/17/2022    BUN 23.8 (H) 10/02/2023    BUN 17 02/17/2022    CR 0.83 10/02/2023    CR 0.74 02/17/2022    GLC 94 10/02/2023    GLC 94 02/17/2022     COAGS:   Lab Results   Component Value Date    INR 1.05 01/15/2022    FIBR 654 (H) 01/15/2022     POC: No results found for: \"BGM\", \"HCG\", \"HCGS\"  HEPATIC:   Lab Results   Component Value Date    ALBUMIN 3.5 02/17/2022    PROTTOTAL 7.1 02/17/2022    ALT 24 02/17/2022    AST 21 02/17/2022    ALKPHOS 142 (H) 02/17/2022    BILITOTAL 0.4 02/17/2022     OTHER:   Lab Results   Component Value Date    A1C 5.9 (H) 02/17/2022    ASHLI 10.0 10/02/2023    MAG 1.9 01/06/2022    TSH 0.66 01/11/2022    CRP 1.9 (H) 01/15/2022       Anesthesia Plan    ASA Status:  1    NPO Status:  NPO Appropriate    Anesthesia Type: Spinal.              Consents    Anesthesia Plan(s) and associated risks, benefits, and realistic alternatives discussed. Questions answered and patient/representative(s) expressed understanding.     - Discussed:     - Discussed with:  Patient            Postoperative Care    Pain management: Multi-modal analgesia.   PONV prophylaxis: Ondansetron (or other 5HT-3), Dexamethasone or Solumedrol, Background Propofol Infusion     Comments:                Mily Hensley MD, MD  "

## 2023-10-27 NOTE — ANESTHESIA PROCEDURE NOTES
"Intrathecal injection Procedure Note    Pre-Procedure   Staff -        Anesthesiologist:  Mily Hensley MD       Performed By: anesthesiologist       Location: OR       Pre-Anesthestic Checklist: patient identified, IV checked, risks and benefits discussed, informed consent, monitors and equipment checked, pre-op evaluation and at physician/surgeon's request  Timeout:       Correct Patient: Yes        Correct Procedure: Yes        Correct Site: Yes        Correct Position: Yes   Procedure Documentation  Procedure: intrathecal injection       Patient Position: sitting       Patient Prep/Sterile Barriers: sterile gloves, mask, patient draped       Skin prep: Betadine       Insertion Site: L4-5. (midline approach).       Needle Gauge: 24.        Needle Length (Inches): 4        Spinal Needle Type: Pencan       Introducer used       Introducer: 20 G       # of attempts: 1 and  # of redirects:     Assessment/Narrative         Paresthesias: No.       CSF fluid: clear.       Opening pressure was cmH2O while  Sitting.      Medication(s) Administered   0.75% Hyperbaric Bupivacaine (Intrathecal) - Intrathecal   1.8 mL - 10/27/2023 7:40:00 AM    FOR [facility] ([facility]) ONLY:   Pain Team Contact information: please page the Pain Team Via [employer]. Search \"Pain\". During daytime hours, please page the attending first. At night please page the resident first.      "

## 2023-10-27 NOTE — ANESTHESIA CARE TRANSFER NOTE
Patient: Paulina Mendez    Procedure: Procedure(s):  right total hip arthroplasty       Diagnosis: Primary osteoarthritis of right hip [M16.11]  Diagnosis Additional Information: No value filed.    Anesthesia Type:   General     Note:    Oropharynx: oropharynx clear of all foreign objects and spontaneously breathing  Level of Consciousness: drowsy  Oxygen Supplementation: nasal cannula  Level of Supplemental Oxygen (L/min / FiO2): 3  Independent Airway: airway patency satisfactory and stable  Dentition: dentition unchanged  Vital Signs Stable: post-procedure vital signs reviewed and stable  Report to RN Given: handoff report given  Patient transferred to: PACU    Handoff Report: Identifed the Patient, Identified the Reponsible Provider, Reviewed the pertinent medical history, Discussed the surgical course, Reviewed Intra-OP anesthesia mangement and issues during anesthesia, Set expectations for post-procedure period and Allowed opportunity for questions and acknowledgement of understanding      Vitals:  Vitals Value Taken Time   /68 10/27/23 1149   Temp     Pulse 71 10/27/23 1153   Resp 11 10/27/23 1153   SpO2 96 % 10/27/23 1153   Vitals shown include unfiled device data.    Electronically Signed By: NAYELI Celeste CRNA  October 27, 2023  11:54 AM

## 2023-10-27 NOTE — PROCEDURES
OPERATIVE REPORT - [facility]    DATE OF SERVICE:  10/27/2023    ATTENDING: HUANG HEADLEY    SURGEON:  HUANG HEADLEY    ASSISTANT:   Leela Dent PA-C    PREOPERATIVE DIAGNOSIS:  Right hip osteoarthritis with collapse of the femoral head    POSTOPERATIVE DIAGNOSIS:    same    PROCEDURES PERFORMED:   Right Total Hip Arthroplasty  Curettage and bone grafting acetabular cyst     ANESTHESIA:  Spinal    ESTIMATED BLOOD LOSS:   100 mL    TRANSFUSIONS:  none    FLUIDS:   Per anesthesia    SPECIMENS:  Arthroplasty resection materials.    DRAIN:  none    COMPLICATIONS:  none    INDICATIONS:   The patient is a very pleasant 78 year old female who has had persistent complaints of hip pain for some time now. The pain interferes with activities of daily living including sitting, standing, and ambulating short distances. The physical examination showed a painful and limited range of motion of the right hip. The x-ray showed bone-on-bone arthritis in his right hip.      The patient has tried nonoperative measures to control his pain including Tylenol, oral anti-inflammatories, activity modification, low impact exercises, assistive devices, injections, none of which have provided satisfactory pain relief. The patient desires joint replacement of the hip to improve their lifestyle and reduce their pain.      Preoperatively, the risks, benefits, and possible complications of the procedure were discussed. The risks discussed included but were not limited to wear, loosening, infection, neurovascular damage, thromboembolic disease, foot drop, limb length inequality, persistent pain, stiffness and dislocation. All of the questions were satisfactorily answered and the patient wished to proceed with joint replacement surgery to improve their lifestyle and reduce their pain.        IMPLANTS:  1. Acetabular component:  [place] Trident Tritanium  50 mm O.D.   2. Acetabular liner: [place] Trident X3 36 mm I.D. 0  degree  3. Acetabular dome screws:  6.5 x 35 and 6.5 x 25 mm  4. Femoral component: [place] size No. 3 44 mm offset 150 mm length  5. Femoral head:  [place] Biolox 36 mm +0 mm  6. Centralized: large [place]  7. Cement Restrictor: medium  8. Bone Cement: Simplex     PROCEDURE:  The patient was brought to the operating room on a bed.  After adequate induction of spinal anesthesia, the patient was rolled into the decubitus position with the right side up.  All bony prominences were padded and an axillary roll was placed.  The hip and leg were then prepped and draped free in the usual sterile fashion using a Betadine scrub and a ChloraPrep paint.    At this point a time-out procedure was carried out to identify the patient, the appropriate operative side, the implants, the code status, the fire risk, the preoperative medications and to confirm that the patient received 2 grams of ancef within one hour of the onset of the procedure.  Following completion of this, we proceeded with the case      A modified Kocher-Daniel exposure of the hip was utilized.  Skin, subcutaneous tissue and fascia was incised at the interval between the tensor fascia pieter and the gluteus errol as best determined and retracted.  The piriformis tendon was identified, tagged and divided at its trochanteric insertion and retracted posteriorly for protection of the sciatic nerve.   Then the gluteus minimus was elevated from the posterior capsule and retracted anteriorly. A posterior capsulotomy was performed in the shape of a T, dividing the short external rotators at the trochanteric insertion and protecting the gluteus medius.  The flaps were tagged and retracted allowing for posterior dislocation of the femoral head.  Femoral neck osteotomy was performed using appropriate template and oscillating saw. Femoral head was delivered from the wound and attention was turned to the femur.    Access to the intramedullary canal was gained using the  tapered awl.  The femur was serial broached up to a size No. 3 44 mm offset. It seated down and was just slightly proud to our calcar surface.   The broach was axially and rotationally stable. The broach was removed.  A femoral wick was placed to aid in hemostasis    The labrum and soft tissue were removed from the bony acetabulum rim.  The pulvinar and osteophytes were removed from the Haversian notch.  The acetabulum was sequentially reamed in 2 and 1 mm increments up to a 49 mm reamer. The acetabulum had exposed bleeding subchondral bone.  There was an acetabular cyst anterior and superior. We curetted out the fibrous material and then bone grafted the cyst.  The bone was harvested from the resected femoral head. Appropriate position, fixation and coverage for a 50 mm acetabular shell was confirmed.  The shell was brought up and impacted it into position. The acetabular shell was stable with impaction. The acetabular dome screws were placed. The peripheral rim osteophytes were removed using a curved half-inch osteotome. The acetabular liner was impacted into place and the locking mechanism was checked.  Attention was turned to trialing.    The trial femoral component was inserted again. The 44 mm offset neck trial was used.  A 36 mm head with a +0 mm neck were used. The limb lengths clinically appeared to be identical.   The hip was stable in flexion, adduction, internal rotation as well as extension and external rotation.      Satisfied with the component alignment, hip stability, limb lengths and range of motion, the hip was dislocated.  The trial components were removed.  The femur was prepared for cementing.  The femur was brushed and irrigated.  The medium cement restrictor was placed.  Two batches of Simplex bone cement were vacuum mixed.  The femur was filled in a retrograde fashion with cement and pressurized.  The size No. 3 44 mm offset femoral component was inserted to a stable position.   It seated  down to the calcar.  The stem was held in position until the cemented hardened. The stem was stable to both axial and rotational stresses. The Rivera taper was cleaned and dried. The hip stability, and length were checked again with the real femoral component in placed.  The final 36 mm outer diameter head with a +0 mm neck was impacted into place. The acetabulum was inspected to ensure it was free of debris. The hip was reduced in an atraumatic fashion. The limb lengths were checked. The length of the legs clinically appeared to be near identical.  The hip was stable on flexion, adduction, internal rotation as well as extension, external rotation. The wounds were copiously irrigated and began a layered closure was performed.      The posterior capsule was closed with #1 Vicryl sutures in an interrupted fashion. Piriform tendon was reattached to the posterior superior aspect of the greater trochanter using a #1 Vicryl suture.  The fascia was closed using #1 Vicryl sutures in an interrupted fashion. The subcutaneous tissue was then closed in two layers using 0 and 2-0 Vicryl sutures.  The skin was brought together, everted and approximated with staples.  Sterile dressings were applied.  Pillows were placed between the legs.  The patient was rolled supine and transported to the PACU in stable condition. At the end of the case, sponge and needle counts were correct.     Hemostasis was obtained throughout the procedure and prior to closure of each layer using electrocautery.  Pulsatile irrigation and dilute betadine irrigation were used during the procedure.   Surgical team body exhaust systems and high exchange air flow were used during the procedure as well.  The patient received 2 grams of ancef prior to the onset of the procedure for antibiotic prophylaxis.      POSTOPERATIVE PLAN:   1. WBAT for the right lower extremity.   2. Antibiotic Prophylaxis were given 2 grams of ancef. Antibiotics were given within 1 hour of  surgical incision and discontinued within 24 hours.  3. Pain control with Tylenol, Vistaril and Celebrex  4. Follow up with Dr. Bueno in 10-14 days. [phone].   5.  DVT prophylaxis aspirin and sequential compression devices will be started within 24 hours of surgery.  6. Plan discharge when meeting therapy goals and patient is medically stable  7. Transfusion requirements to be allogenic/autogenic in nature.   8. Caution with NSAID usage.    Alexandru Bueno MD  [facility]  [phone]  [phone]

## 2023-10-27 NOTE — CONSULTS
Home PT arranged with Lien Engle PTA. No need to place consult.  Provide can just add the information to their progress note.  Consul cleared.

## 2023-10-27 NOTE — ANESTHESIA POSTPROCEDURE EVALUATION
Patient: Paulina Mendez    Procedure: Procedure(s):  right total hip arthroplasty       Anesthesia Type:  General    Note:     Postop Pain Control: Uneventful            Sign Out: Well controlled pain   PONV: No   Neuro/Psych: Uneventful            Sign Out: Acceptable/Baseline neuro status   Airway/Respiratory: Uneventful            Sign Out: Acceptable/Baseline resp. status   CV/Hemodynamics: Uneventful            Sign Out: Acceptable CV status; No obvious hypovolemia; No obvious fluid overload   Other NRE:    DID A NON-ROUTINE EVENT OCCUR? No           Last vitals:  Vitals Value Taken Time   /76 10/27/23 1215   Temp 36.2  C (97.1  F) 10/27/23 1149   Pulse 74 10/27/23 1229   Resp 11 10/27/23 1229   SpO2 99 % 10/27/23 1227   Vitals shown include unfiled device data.    Electronically Signed By: Mily Hensley MD, MD  October 27, 2023  1:11 PM

## 2023-10-27 NOTE — PROGRESS NOTES
Date/Time:  10/27/23  1500    Trauma/Ortho/Medical (Choose one):  Ortho    Diagnosis:  R OJ  POD#:  DOS   Mental Status:  A&O x4  Activity/dangle:  declined at this time  Diet: Regular  Pain: denies 'pain' at this time, c/o mild tightness on groin, ice pack applied, declined pain med  Pineda/Voiding: denies urge to urinate at this time, denies bladder fullness or discomfort  Tele/Restraints/Iso:n/a  02/LDA: RA, IV fluid infusing  D/C Date:  10/28  Other Info:      Does patient have an identified :  Yes  Has goal D/C date and time been discussed with patient:  Yes

## 2023-10-28 ENCOUNTER — APPOINTMENT (OUTPATIENT)
Dept: PHYSICAL THERAPY | Facility: CLINIC | Age: 78
End: 2023-10-28
Attending: ORTHOPAEDIC SURGERY
Payer: COMMERCIAL

## 2023-10-28 ENCOUNTER — APPOINTMENT (OUTPATIENT)
Dept: OCCUPATIONAL THERAPY | Facility: CLINIC | Age: 78
End: 2023-10-28
Attending: ORTHOPAEDIC SURGERY
Payer: COMMERCIAL

## 2023-10-28 VITALS
DIASTOLIC BLOOD PRESSURE: 61 MMHG | SYSTOLIC BLOOD PRESSURE: 93 MMHG | BODY MASS INDEX: 26.52 KG/M2 | OXYGEN SATURATION: 96 % | HEART RATE: 58 BPM | TEMPERATURE: 97.6 F | HEIGHT: 66 IN | WEIGHT: 165 LBS | RESPIRATION RATE: 16 BRPM

## 2023-10-28 LAB
FASTING STATUS PATIENT QL REPORTED: NO
GLUCOSE SERPL-MCNC: 130 MG/DL (ref 70–99)
HGB BLD-MCNC: 10.6 G/DL (ref 11.7–15.7)

## 2023-10-28 PROCEDURE — 36415 COLL VENOUS BLD VENIPUNCTURE: CPT

## 2023-10-28 PROCEDURE — 250N000011 HC RX IP 250 OP 636: Mod: JZ

## 2023-10-28 PROCEDURE — 85018 HEMOGLOBIN: CPT

## 2023-10-28 PROCEDURE — 82947 ASSAY GLUCOSE BLOOD QUANT: CPT | Performed by: ORTHOPAEDIC SURGERY

## 2023-10-28 PROCEDURE — 97530 THERAPEUTIC ACTIVITIES: CPT | Mod: GP

## 2023-10-28 PROCEDURE — 250N000013 HC RX MED GY IP 250 OP 250 PS 637

## 2023-10-28 PROCEDURE — 97166 OT EVAL MOD COMPLEX 45 MIN: CPT | Mod: GO | Performed by: OCCUPATIONAL THERAPIST

## 2023-10-28 PROCEDURE — 97116 GAIT TRAINING THERAPY: CPT | Mod: GP

## 2023-10-28 PROCEDURE — 97535 SELF CARE MNGMENT TRAINING: CPT | Mod: GO | Performed by: OCCUPATIONAL THERAPIST

## 2023-10-28 RX ADMIN — ACETAMINOPHEN 975 MG: 325 TABLET, FILM COATED ORAL at 08:57

## 2023-10-28 RX ADMIN — CELECOXIB 100 MG: 100 CAPSULE ORAL at 11:59

## 2023-10-28 RX ADMIN — CEFAZOLIN SODIUM 2 G: 2 INJECTION, SOLUTION INTRAVENOUS at 01:08

## 2023-10-28 RX ADMIN — ACETAMINOPHEN 975 MG: 325 TABLET, FILM COATED ORAL at 01:07

## 2023-10-28 RX ADMIN — FAMOTIDINE 20 MG: 20 TABLET ORAL at 08:57

## 2023-10-28 RX ADMIN — CELECOXIB 100 MG: 100 CAPSULE ORAL at 01:06

## 2023-10-28 RX ADMIN — ASPIRIN 81 MG: 81 TABLET, COATED ORAL at 08:57

## 2023-10-28 ASSESSMENT — ACTIVITIES OF DAILY LIVING (ADL)
ADLS_ACUITY_SCORE: 20

## 2023-10-28 NOTE — PROGRESS NOTES
ORTHOPEDIC LOWER EXTREMITY PROGRESS NOTE    POD# 1  Patient is a 78 year old female who underwent Procedure(s):  right total hip arthroplasty on 10/27/2023. Pain is well controlled. No complaints this AM, feeling well. Ambar thinks the limb length difference is much better    Vitals: VSS-AF    Drains:  none    EXAM   The patient is awake and alert.  Calves are soft and non-tender.   Sensation is intact.  Dorsiflexion and plantar flexion is intact.  Foot warm with nl cap refill.  The incision is covered.     Labs:   Recent Labs   Lab Test 10/27/23  0816 10/02/23  1452 01/17/22  0834 01/16/22  0709 01/15/22  0746 01/14/22  0720 01/13/22  0703   HGB 12.7 13.4 13.2   < > 12.4 12.3 12.0   INR  --   --   --   --  1.05 1.02 1.10    < > = values in this interval not displayed.     CX: none  ASSESSMENT  S/p Right OJ   PLAN  1. DVT prophylaxis: aspirin  2. Weight Bearing: WBAT  3. Anticipated discharge date today . Discharge to home with home care  4. Cont Pain Control: Tylenol, Celebrex and Vistaril  5. Prevena wound VAC until POD #14  6.  Follow up with Dr. Bueno in 10-14 days

## 2023-10-28 NOTE — PLAN OF CARE
Goal Outcome Evaluation:    Patient vital signs are at baseline: Yes  Patient able to ambulate as they were prior to admission or with assist devices provided by therapies during their stay: Yes  Patient MUST void prior to discharge: Yes  Patient able to tolerate oral intake: Yes  Pain has adequate pain control using oral analgesics:Yes  Does patient have an identified : Yes  Has goal discharge date and time been discussed with patient: Yes     A&Ox4, VSS, CMS intact, dressing CDI, wound vac in place with 0 output. Voiding spontaneously, no BM yet. Seen by therapy this AM. Discharging home today.    Patient discharging to home. Transport provided by patient's daughter. Discharge instructions provided. Discharge meds provided. All questions answered. All belongings sent with patient. PIV removed, patient tolerated well. Portable wound vac set up and connected to patient.

## 2023-10-28 NOTE — PLAN OF CARE
Physical Therapy Discharge Summary    Reason for therapy discharge:    All goals and outcomes met, no further needs identified.    Progress towards therapy goal(s). See goals on Care Plan in [facility] electronic health record for goal details.  Goals met    Therapy recommendation(s):    Defer to ortho. Pt is mobilizing well with FWW.

## 2023-10-28 NOTE — PLAN OF CARE
Goal Outcome Evaluation:    Patient vital signs are at baseline: Yes  Patient able to ambulate as they were prior to admission or with assist devices provided by therapies during their stay:  Yes  Patient MUST void prior to discharge:  Yes  Patient able to tolerate oral intake:  Yes  Pain has adequate pain control using Oral analgesics:  Yes  Does patient have an identified :  Yes  Has goal D/C date and time been discussed with patient:  Yes     A/O x4. CMS intact. Dressing CDI.PIV sl. Wound vac in place. Plan to discharge today.

## 2023-10-28 NOTE — PROGRESS NOTES
[facility]  OUTPATIENT OCCUPATIONAL THERAPY  EVALUATION  PLAN OF TREATMENT FOR OUTPATIENT REHABILITATION  (COMPLETE FOR INITIAL CLAIMS ONLY)  Patient's Last Name, First Name, M.I.  YOB: 1945  Paulina Mendez                          Provider's Name  [facility] Medical Record No.  [id number]                             Onset Date:  10/27/23   Start of Care Date:  10/28/23   Type:     ___PT   _X_OT   ___SLP Medical Diagnosis:  OJ                    OT Diagnosis:  impaired ADLs Visits from SOC:  1     See note for plan of treatment, functional goals and certification details    I CERTIFY THE NEED FOR THESE SERVICES FURNISHED UNDER        THIS PLAN OF TREATMENT AND WHILE UNDER MY CARE     (Physician co-signature of this document indicates review and certification of the therapy plan).                               10/28/23 1939   Appointment Info   Signing Clinician's Name / Credentials (OT) Lisa Fry OTR/L   Quick Adds   Quick Adds Certification   Living Environment   People in Home alone   Current Living Arrangements house   Home Accessibility stairs to enter home   Number of Stairs, Main Entrance 1   Stair Railings, Main Entrance railings safe and in good condition   Transportation Anticipated family or friend will provide   Living Environment Comments Family will plan to stay with her while she recovers-- daughters   Self-Care   Usual Activity Tolerance moderate   Current Activity Tolerance fair   Equipment Currently Used at Home dressing device;grab bar, tub/shower;commode chair;shower chair;cane, straight;walker, rolling   Fall history within last six months no   Activity/Exercise/Self-Care Comment Pt reports indep with ADLs at baseline, more difficult due to pain.   Instrumental Activities of Daily Living (IADL)   IADL Comments Family can assist at dc   General Information   Onset of Illness/Injury or Date of Surgery  10/27/23   Referring Physician Janet Dent PA-C   Patient/Family Therapy Goal Statement (OT) home   Additional Occupational Profile Info/Pertinent History of Current Problem R OJ   Existing Precautions/Restrictions fall   Left Lower Extremity (Weight-bearing Status) full weight-bearing (FWB)   Right Lower Extremity (Weight-bearing Status) weight-bearing as tolerated (WBAT)   General Observations and Info activity order: ambulate with assist 3x daily   Cognitive Status Examination   Affect/Mental Status (Cognitive) WFL   Follows Commands follows one-step commands;over 90% accuracy   Attention Deficit minimal deficit;distractible in quiet environment   Pain Assessment   Patient Currently in Pain Yes, see Vital Sign flowsheet   Posture   Posture forward head position;protracted shoulders   Range of Motion Comprehensive   Comment, General Range of Motion BUE WFL; RLE limited by surgical pain   Strength Comprehensive (MMT)   Comment, General Manual Muscle Testing (MMT) Assessment BUE WFL; RLE limited by surgical pain   Coordination   Upper Extremity Coordination No deficits were identified   Bed Mobility   Bed Mobility supine-sit;sit-supine   Supine-Sit [place] (Bed Mobility) supervision   Sit-Supine [place] (Bed Mobility) supervision   Transfers   Transfers sit-stand transfer;toilet transfer;shower transfer   Sit-Stand Transfer   Sit-Stand [place] (Transfers) contact guard;verbal cues   Assistive Device (Sit-Stand Transfers) walker, front-wheeled   Shower Transfer   Shower Transfer Comments ModA per clinical judgement; tub/shower w/grab bars and shower chair   Toilet Transfer   Type (Toilet Transfer) sit-stand;stand-sit   [place] Level (Toilet Transfer) contact guard;verbal cues   Assistive Device (Toilet Transfer) commode chair;walker, front-wheeled   Balance   Balance Comments good seated; good ambulating in room with 2WW   Activities of Daily Living   BADL Assessment/Intervention upper  body dressing;lower body dressing;grooming;toileting   Upper Body Dressing Assessment/Training   [place] Level (Upper Body Dressing) independent   Lower Body Dressing Assessment/Training   Position (Lower Body Dressing) edge of bed sitting   Assistive Devices (Lower Body Dressing) reacher;sock-aid   [place] Level (Lower Body Dressing) minimum assist (75% patient effort);verbal cues;pants/bottoms;socks   Grooming Assessment/Training   [place] Level (Grooming) independent   Toileting   [place] Level (Toileting) set up   Clinical Impression   Criteria for Skilled Therapeutic Interventions Met (OT) Yes, treatment indicated   OT Diagnosis impaired ADLs   OT Problem List-Impairments impacting ADL problems related to;activity tolerance impaired;range of motion (ROM);strength;pain   Assessment of Occupational Performance 3-5 Performance Deficits   Identified Performance Deficits dressing, toilet transfer, tub/shower transfer   Planned Therapy Interventions (OT) ADL retraining;transfer training   Clinical Decision Making Complexity (OT) detailed assessment/moderate complexity   Risk & Benefits of therapy have been explained evaluation/treatment results reviewed;patient;daughter   OT Total Evaluation Time   OT Eval, Moderate Complexity Minutes (07442) 10   Therapy Certification   Medical Diagnosis OJ   Start of Care Date 10/28/23   Certification date from 10/28/23   Certification date to 10/28/23   OT Goals   Therapy Frequency (OT) One time eval and treatment   OT Predicted Duration/Target Date for Goal Attainment 10/28/23   OT Goals Lower Body Dressing;Toilet Transfer/Toileting;Transfers   OT: Lower Body Dressing Supervision/stand-by assist;using adaptive equipment   OT: Transfer Supervision/stand-by assist;with assistive device   OT: Toilet Transfer/Toileting Modified independent;toilet transfer;cleaning and garment management;using adaptive equipment   Interventions   Interventions Quick Adds  Self-Care/Home Management   Self-Care/Home Management   Self-Care/Home Mgmt/ADL, Compensatory, Meal Prep Minutes (69251) 30   Treatment Detail/Skilled Intervention Pt greeted seated in bed, dtr present for session and supportive.  Pt ed in not abandoning walker with toilet transfer, and positioning fully at commode overlay prior to sitting as pt attempting to sit angled; Shashi following.  Dtr quick to assist.  Pt managed clothing/kendra-cares indep.  SBA to stand at sink for hand hygienes. SBA for room ambulation, Ax1 for managing wound vac.  Pt ed in using reacher/sock aide for LE dressing, and she completed Shashi following.  Reviewed tub/shower transfer options, and encouraged pt to do dry run practice with family present, prior to attempting first shower; ed in stepping in with grab bars; dtr reports her sister will complete with pt.  Pt ed in using grab bars, shower mat, LH brush, pt endorsing all.  Ed pt in walker safety/transport of objects, pt will use a bag from 4WW on her 2WW.   OT Discharge Planning   OT Plan dc   OT Discharge Recommendation (DC Rec)   (defer to ortho md)   OT Rationale for DC Rec Anticipate pt will be Shashi toilet transfer, Shashi LE dressing, Ar tub/shower transfer, A for home chores.   Total Session Time   Timed Code Treatment Minutes 30   Total Session Time (sum of timed and untimed services) 40         Occupational Therapy Discharge Summary    Reason for therapy discharge:    All goals and outcomes met, no further needs identified.    Progress towards therapy goal(s). See goals on Care Plan in [facility] electronic health record for goal details.  Goals met    Therapy recommendation(s):    No further therapy is recommended.

## 2023-10-28 NOTE — PROGRESS NOTES
Patient vital signs are at baseline: Yes  Patient able to ambulate as they were prior to admission or with assist devices provided by therapies during their stay:  Yes  Patient MUST void prior to discharge:  Yes  Patient able to tolerate oral intake:  Yes  Pain has adequate pain control using Oral analgesics:  Yes  Does patient have an identified :  Yes  Has goal D/C date and time been discussed with patient:  Yes    Diagnosis:Right toal hip   POD#:0  Mental Status:AxOx4  Activity/dangle: Ax1 GB and walker   Diet:Regular   Pain:Denies   Pineda/Voiding:Up to bathroom   02/LDA:Continuous fluids   D/C Date:TBD, likely tomorrow

## 2023-10-31 ENCOUNTER — TELEPHONE (OUTPATIENT)
Dept: FAMILY MEDICINE | Facility: CLINIC | Age: 78
End: 2023-10-31

## 2023-10-31 NOTE — TELEPHONE ENCOUNTER
Have not seen this patient in over a year and a half.  This is something that should be coming from her orthopedic surgeon as they did the surgery?

## 2023-10-31 NOTE — TELEPHONE ENCOUNTER
Home Care is calling regarding an established patient with [facility].       Requesting orders from: Jessica Panda  Provider is following patient: Yes  Is this a 60-day recertification request?  No    Orders Requested    Skilled Nursing  Request for initial evaluation and treatment (one time)   Every other week for 6 weeks  For incision monitoring  Fall prevention  Reduce risks for hospitalizations  Prevention of skin break down.    Confirmed ok to leave a detailed message with call back.  Contact information confirmed and updated as needed.    Corinne Ravenwald, RN

## 2023-10-31 NOTE — TELEPHONE ENCOUNTER
Order/Referral Request    Who is requesting: Alka from [facility]     Orders being requested: Skilled Nursing to Evaluate and Treat     Reason service is needed/diagnosis:   Every other week for 6 weeks  For incision monitoring  Fall prevention  Reduce risks for hospitalizations  Prevention of skin break down.    When are orders needed by: ASAP    Has this been discussed with Provider: No    Does patient have a preference on a Group/Provider/Facility? [facility]    Does patient have an appointment scheduled?: No    Where to send orders: N/A Verbal Orders    Okay to leave a detailed message?: Yes at Other phone number:  Alka at [phone]

## 2023-10-31 NOTE — TELEPHONE ENCOUNTER
Left  with Alka with [facility] that this patient has not been seen here in over a year, and that this would best be addressed by the surgeon.

## 2023-11-07 ENCOUNTER — DOCUMENTATION ONLY (OUTPATIENT)
Dept: OTHER | Facility: CLINIC | Age: 78
End: 2023-11-07
Payer: COMMERCIAL

## 2023-11-09 ENCOUNTER — TRANSFERRED RECORDS (OUTPATIENT)
Dept: HEALTH INFORMATION MANAGEMENT | Facility: CLINIC | Age: 78
End: 2023-11-09
Payer: COMMERCIAL

## 2023-12-07 ENCOUNTER — TRANSFERRED RECORDS (OUTPATIENT)
Dept: HEALTH INFORMATION MANAGEMENT | Facility: CLINIC | Age: 78
End: 2023-12-07
Payer: COMMERCIAL

## 2024-04-23 ENCOUNTER — TELEPHONE (OUTPATIENT)
Dept: FAMILY MEDICINE | Facility: CLINIC | Age: 79
End: 2024-04-23
Payer: COMMERCIAL

## 2024-04-23 NOTE — TELEPHONE ENCOUNTER
Patient Quality Outreach    Patient is due for the following:   Physical Annual Wellness Visit  Lipid Profile    Next Steps:   Schedule a Annual Wellness Visit    Type of outreach:    Sent letter.      Questions for provider review:    None           Jackelyn Spear

## 2024-04-23 NOTE — LETTER
April 23, 2024      Paulina Mendez  [address] [facility] [id number]      Your team at [facility] cares about your health. We have reviewed your chart and based on our findings; we are making the following recommendations to better manage your health.     You are in particular need of attention regarding the following:     PREVENTATIVE VISIT: Annual Medicare Wellness:Schedule an Annual Medicare Wellness Exam. Please call your [facility] clinic to set up your appointment.    FASTING LAB - Lipid Profile     If you have already completed these items, please contact the clinic via phone or   MyChart so your care team can review and update your records. Thank you for   choosing [facility] for your healthcare needs. For any questions,   concerns, or to schedule an appointment please contact our clinic.    Healthy Regards,      Your [facility] Team

## (undated) DEVICE — SOL WATER IRRIG 1000ML BOTTLE 2F7114

## (undated) DEVICE — LINEN TOWEL PACK X5 5464

## (undated) DEVICE — PAD FOAM MCGUIRE KIT 0814-0150

## (undated) DEVICE — NDL 22GA 1" 305155

## (undated) DEVICE — DRAPE CONVERTORS U-DRAPE 60X72" 8476

## (undated) DEVICE — CANISTER WOUND VAC W/GEL 500ML M8275063/5

## (undated) DEVICE — BONE CEMENT BIOPREP FEMORAL PREP PLUG INSERTER 0206-710-000

## (undated) DEVICE — SYR 03ML LL W/O NDL 309657

## (undated) DEVICE — DRAPE IOBAN INCISE 23X17" 6650EZ

## (undated) DEVICE — MANIFOLD NEPTUNE 4 PORT 700-20

## (undated) DEVICE — ESU GROUND PAD UNIVERSAL W/O CORD

## (undated) DEVICE — GLOVE BIOGEL PI MICRO INDICATOR UNDERGLOVE SZ 7.0 48970

## (undated) DEVICE — GLOVE BIOGEL PI SZ 6.5 40865

## (undated) DEVICE — PACK TOTAL HIP W/POUCH SOP15HPFSM

## (undated) DEVICE — SU VICRYL 2-0 CT-1 27" UND J259H

## (undated) DEVICE — BNDG COBAN 6"X5YDS STERILE

## (undated) DEVICE — SOLUTION WOUND CLEANSING 3/4OZ 10% PVP EA-L3011FB-50

## (undated) DEVICE — GOWN XXLG REINFORCED 9071EL

## (undated) DEVICE — GOWN IMPERVIOUS SPECIALTY XLG/XLONG 32474

## (undated) DEVICE — SUCTION IRR SYSTEM W/O TIP INTERPULSE HANDPIECE 0210-100-000

## (undated) DEVICE — DECANTER BAG 2002S

## (undated) DEVICE — SU ETHILON 3-0 PS-1 18" 1663G

## (undated) DEVICE — SYR 30ML LL W/O NDL 302832

## (undated) DEVICE — DRAPE STERI TOWEL LG 1010

## (undated) DEVICE — SOL NACL 0.9% INJ 1000ML BAG 2B1324X

## (undated) DEVICE — GLOVE BIOGEL PI MICRO INDICATOR UNDERGLOVE SZ 8.0 48980

## (undated) DEVICE — PACK UNIVERSAL SPLIT 29131

## (undated) DEVICE — PREP SKIN SCRUB TRAY 4461A

## (undated) DEVICE — SOL NACL 0.9% IRRIG 3000ML BAG 2B7477

## (undated) DEVICE — DRSG WND NEGATIVE PRESSURE PREVENA 20CM PRE1055US.S

## (undated) DEVICE — GLOVE BIOGEL PI SZ 7.5 40875

## (undated) DEVICE — ESU ELEC BLADE 6" COATED E1450-6

## (undated) DEVICE — BLADE SAW SAGITTAL STRK MED WIDE 25X73X0.89MM 2108-105-000

## (undated) DEVICE — SU VICRYL 1 CTX CR 8X18" J765D

## (undated) DEVICE — SU VICRYL 0 CT-1 CR 8X18" J740D

## (undated) DEVICE — PUMP UNIT NEGATIVE PRESSURE PREVENA PLUS PRE4010.S

## (undated) DEVICE — STPL SKIN 35W 6.9MM  PXW35

## (undated) DEVICE — DRSG AQUACEL AG 3.5X9.75" HYDROFIBER 412011

## (undated) DEVICE — HOOD SURG T7PLUS PEEL AWAY FACE SHIELD STRL LF 0416-801-100

## (undated) RX ORDER — PROPOFOL 10 MG/ML
INJECTION, EMULSION INTRAVENOUS
Status: DISPENSED
Start: 2023-10-27

## (undated) RX ORDER — HYDROMORPHONE HYDROCHLORIDE 1 MG/ML
INJECTION, SOLUTION INTRAMUSCULAR; INTRAVENOUS; SUBCUTANEOUS
Status: DISPENSED
Start: 2023-10-27

## (undated) RX ORDER — TRANEXAMIC ACID 650 MG/1
TABLET ORAL
Status: DISPENSED
Start: 2023-10-27

## (undated) RX ORDER — VANCOMYCIN HYDROCHLORIDE 1 G/20ML
INJECTION, POWDER, LYOPHILIZED, FOR SOLUTION INTRAVENOUS
Status: DISPENSED
Start: 2023-10-27

## (undated) RX ORDER — EPHEDRINE SULFATE 50 MG/ML
INJECTION, SOLUTION INTRAMUSCULAR; INTRAVENOUS; SUBCUTANEOUS
Status: DISPENSED
Start: 2023-10-27

## (undated) RX ORDER — ACETAMINOPHEN 325 MG/1
TABLET ORAL
Status: DISPENSED
Start: 2023-10-27